# Patient Record
Sex: FEMALE | Race: WHITE | NOT HISPANIC OR LATINO | Employment: FULL TIME | URBAN - METROPOLITAN AREA
[De-identification: names, ages, dates, MRNs, and addresses within clinical notes are randomized per-mention and may not be internally consistent; named-entity substitution may affect disease eponyms.]

---

## 2017-03-09 ENCOUNTER — OFFICE VISIT (OUTPATIENT)
Dept: DERMATOLOGY | Facility: CLINIC | Age: 37
End: 2017-03-09
Payer: COMMERCIAL

## 2017-03-09 DIAGNOSIS — Z86.018 HISTORY OF DYSPLASTIC NEVUS: ICD-10-CM

## 2017-03-09 DIAGNOSIS — L90.5 SCAR: Primary | ICD-10-CM

## 2017-03-09 PROCEDURE — 99212 OFFICE O/P EST SF 10 MIN: CPT | Performed by: DERMATOLOGY

## 2017-03-09 NOTE — MR AVS SNAPSHOT
After Visit Summary   3/9/2017    Kaia Smith    MRN: [id number]           Patient Information     Date Of Birth          1980        Visit Information        Provider Department      3/9/2017 8:30 AM Ellen Okeefe MD [facility]        Today's Diagnoses     Scar    -  1    History of dysplastic nevus           Follow-ups after your visit        Your next 10 appointments already scheduled     Sep 13, 2017  9:45 AM CDT   [facility] with [facility]ue [facility]   [facility]    [address] [place] [address] [phone]              Who to contact     If you have questions or need follow up information about today's clinic visit or your schedule please contact [facility] directly at [phone].  Normal or non-critical lab and imaging results will be communicated to you by [employer], letter or phone within 4 business days after the clinic has received the results. If you do not hear from us within 7 days, please contact the clinic through [employer] or phone. If you have a critical or abnormal lab result, we will notify you by phone as soon as possible.  Submit refill requests through [employer] or call your pharmacy and they will forward the refill request to us. Please allow 3 business days for your refill to be completed.          Additional Information About Your Visit        MyChart Information     [employer] gives you secure access to your electronic health record. If you see a primary care provider, you can also send messages to your care team and make appointments. If you have questions, please call your primary care clinic.  If you do not have a primary care provider, please call [phone] and they will assist you.      [employer] is an electronic gateway that provides easy, online access to your medical records. With [employer], you can request a clinic appointment, read your test results, renew a  prescription or communicate with your care team.     To access your existing account, please contact your [facility] or call [phone] for assistance.        Care EveryWhere ID     This is your Care EveryWhere ID. This could be used by other organizations to access your [facility] medical records  [id number]         Blood Pressure from Last 3 Encounters:   10/18/16 95/60   07/12/16 106/70   06/20/16 99/60    Weight from Last 3 Encounters:   10/18/16 58.1 kg (128 lb 1.6 oz)   07/12/16 57.3 kg (126 lb 6.4 oz)   06/20/16 57.5 kg (126 lb 11.2 oz)              Today, you had the following     No orders found for display       Primary Care Provider Office Phone # Fax #    Annie Albrecht [phone]       [facility] [address] [facility] [address]        Thank you!     Thank you for choosing [facility]  for your care. Our goal is always to provide you with excellent care. Hearing back from our patients is one way we can continue to improve our services. Please take a few minutes to complete the written survey that you may receive in the mail after your visit with us. Thank you!             Your Updated Medication List - Protect others around you: Learn how to safely use, store and throw away your medicines at www.disposemymeds.org.      Notice  As of 3/9/2017  9:05 AM    You have not been prescribed any medications.

## 2017-03-09 NOTE — NURSING NOTE
Dermatology Rooming Note    Kaia Smith's goals for this visit include:   Chief Complaint   Patient presents with     RECHECK     6 month follow up - recheck mole on lower back       Is a scribe okay for this visit: YES    Are records needed for this visit(If yes, obtain release of information): NO     Vitals: There were no vitals taken for this visit.    Referring Provider:  No referring provider defined for this encounter.    Liliana Banks, CMA

## 2017-03-09 NOTE — PROGRESS NOTES
[facility] Dermatology Note      Dermatology Problem List:  1. Compound nevus with mild dysplasia, right lower back  -s/p biopsy 2016    Encounter Date: Mar 9, 2017    CC:  Chief Complaint   Patient presents with     RECHECK     6 month follow up - recheck mole on lower back         History of Present Illness:  Ms. Kaia Smith is a 36 year old female who presents as a follow up for dysplastic nevus. The patient was last seen 2016 when a biopsy was performed on the right lower back. Today, the patient denies any other lesions bleeding, crusting or changing. The patient reports no other lesions of concern.     Past Medical History:   Patient Active Problem List   Diagnosis     CARDIOVASCULAR SCREENING; LDL GOAL LESS THAN 160     General counseling for prescription of oral contraceptives     Abdominal pain, right upper quadrant     Past Medical History   Diagnosis Date     Acute mastitis of left breast 2014     Menarche      13 y/o     Traumatic injury during pregnancy 2013     Past Surgical History   Procedure Laterality Date     Hysteroscopy,diagnostic  2008     Remv pilonidal lesion simple  10/20/2005     Appendectomy       13 y/o      section  3/19/2013     Procedure:  SECTION;;  Surgeon: Maddie Stallworth MD;  Location: [facility] license of [facility]     Social History:  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 The patient has 1-2 alcoholic drinks per week does not smoke or use tobacco.     Family History:  There is no family history of skin cancer.    Medications:  No current outpatient prescriptions on file.     No Known Allergies    Review of Systems:  -Skin: As above in HPI. No additional skin concerns.    Physical exam:  Vitals: There were no vitals taken for this visit.  GEN: This is a well developed, well-nourished female in no acute distress, in a pleasant mood.    SKIN: Focused examination of the lower back was  performed.  -Scar with normal re-pigmentation. Compared with photos  -No other lesions of concern on areas examined.     Impression/Plan:  1. Compound nevus with mild dysplasia, right lower back, s/p biopsy 9/27/2016, some recurrence within scar but normal    Discussed options of monitoring versus excision and she elects for monitoring. Reviewed case with Dr. Garrido who agreed, recheck in 6 months    Plan to recheck at follow up visit.     Last total skin exam 9/27/2016    Follow up in 6 months, earlier for new or changing lesions.     Staff Involved:  Scribe/Staff    Scribe Disclosure:   I, Meme Banuelos, am serving as a scribe to document services personally performed by Dr. Ellen Okeefe, based on data collection and the provider's statements to me.     Provider Disclosure:   I agree with above History, Review of Systems, Physical exam and Plan. I have reviewed the content of the documentation and have edited it as needed. I have personally performed the services documented here and the documentation accurately represents those services and the decisions I have made.     Ellen Okeefe MD    Department of Dermatology  [facility]: Phone: [phone], Fax:[phone]  [facility]: Phone: [phone], Fax: [phone]

## 2017-08-03 ENCOUNTER — TELEPHONE (OUTPATIENT)
Dept: DERMATOLOGY | Facility: CLINIC | Age: 37
End: 2017-08-03

## 2017-08-03 NOTE — TELEPHONE ENCOUNTER
I left a message for patient to call [facility].  Patient has an appointment with Dr. Josue on 9/13 for skin check.  Provider is not available and appointment needs to be rescheduled.       SHe may also schedule with Suzanne or Dr. Okeefe when she comes back. OK to schedule.     Call Center - ok for you to notify patient of the following:    MD Helene Longoria PA Kristen McCarthy, PA  [facility]  [address] [place] [address]  [phone]    Dylan Buck MD  [facility]  [address] [place] [address]  [phone]    [facility]    Dermatology Department  [address] [place] [address]  [phone]    Maribell Boucher CMA

## 2017-08-14 ENCOUNTER — VIRTUAL VISIT (OUTPATIENT)
Dept: FAMILY MEDICINE | Facility: OTHER | Age: 37
End: 2017-08-14

## 2017-08-14 NOTE — PROGRESS NOTES
"Date:   Clinician: Mathieu Gupta  Clinician NPI: [id number]  Patient: Kaia Smith  Patient : 1980  Patient Address: [address] [place] [address]  Patient Phone: [phone]  Visit Protocol: UTI  Patient Summary:  Kaia is a 36 year old ( : 1980 ) female who initiated a Visit for a presumed bladder infection. When asked the question \"Please sign me up to receive news, health information and promotions from [employer].\", Kaia responded \"No\".    Her symptoms began today and consist of urgency, urinary frequency, and dysuria.   Symptom Details   Urinary Frequency: Several times each hour    She denies flank pain, loss of appetite, chills, fever, urinary incontinence, hesitation, vaginal discharge, abdominal pain, recent antibiotic use, hematuria, foul smelling urine, vomiting, and nausea. Kaia has never had kidney stones. She has not been hospitalized, been a patient in a nursing home, or had a catheter in the past two weeks. She denies risk factors for sexually transmitted infections.   Kaia has not had any UTIs in the past 12 months. Her current symptoms are similar to the previous UTI symptoms. She took an antibiotic for her last infection but does not remember which one.   Kaia does not get yeast infections when she takes antibiotics.   She denies pregnancy and denies breastfeeding. She is currently menstruating.   She does NOT smoke or use smokeless tobacco.  MEDICATIONS:  No current medications , ALLERGIES:  NKDA   Clinician Response:  Dear Kaia,  Based on the information you have provided, you likely have a bladder infection, also called an acute urinary tract infection (UTI).   To treat your infection, I am prescribing:   Nitrofurantoin (Macrobid). Swallow one (1) tablet twice a day for 5 days. Take the tablet with food. Continue taking the tablets even if you feel better before all the medication is gone. There is no refill with this prescription.   Some people develop " allergies to antibiotics. If you notice a new rash, significant swelling, or difficulty breathing, stop the medication immediately and go into a clinic for physical evaluation.   To help treat your current UTI and prevent future occurrences, remember to:     Drink 8-10, 8-ounce glasses of water daily.    Urinate after sexual intercourse.    Wipe front to back after using the bathroom.     Some women may develop a yeast infection as a side effect of taking antibiotics. If you notice symptoms of a yeast infection, [facility] can help treat that condition as well. Simply log in and complete another Visit, which will cover all of the necessary questions to determine the best treatment for you.   You should visit a clinic for a follow-up visit if your symptoms do not improve in 1-2 days or if you experience another urinary tract infection soon after completing this treatment.  If you become pregnant during this course of treatment, stop taking the medication and contact your primary care provider.   Diagnosis: Acute Uncomplicated Bladder Infection  Diagnosis ICD: N39.0  Prescription: nitrofurantoin (Macrobid) 100mg oral tablet 10 tablets, 5 days supply. Take one tablet by mouth two times a day for 5 days. Refills: 0, Refill as needed: no, Allow substitutions: yes  Pharmacy: [id number] - [phone] - [address] [address]

## 2017-08-15 NOTE — TELEPHONE ENCOUNTER
Left message for patient to call [phone] to reschedule.  She can be schedule earlier with Dr. Josue if there is an opening or with Lele Retana.    Liliana Banks CMA

## 2017-08-21 ENCOUNTER — OFFICE VISIT (OUTPATIENT)
Dept: DERMATOLOGY | Facility: CLINIC | Age: 37
End: 2017-08-21
Payer: COMMERCIAL

## 2017-08-21 DIAGNOSIS — Z86.018 HISTORY OF DYSPLASTIC NEVUS: Primary | ICD-10-CM

## 2017-08-21 DIAGNOSIS — D22.5 RECURRENT NEVUS OF BACK: ICD-10-CM

## 2017-08-21 PROCEDURE — 99212 OFFICE O/P EST SF 10 MIN: CPT | Performed by: DERMATOLOGY

## 2017-08-21 ASSESSMENT — PAIN SCALES - GENERAL: PAINLEVEL: NO PAIN (0)

## 2017-08-21 NOTE — MR AVS SNAPSHOT
After Visit Summary   8/21/2017    Kaia Smith    MRN: [id number]           Patient Information     Date Of Birth          1980        Visit Information        Provider Department      8/21/2017 1:15 PM Tomas Josue MD [facility]        Today's Diagnoses     History of dysplastic nevus    -  1    Recurrent nevus of back           Follow-ups after your visit        Your next 10 appointments already scheduled     Mar 09, 2018  9:45 AM CST   Return Visit with Ellen Okeefe MD   [facility] ([facility]    [id number] [place] [address] [phone]              Who to contact     If you have questions or need follow up information about today's clinic visit or your schedule please contact [facility] directly at [phone].  Normal or non-critical lab and imaging results will be communicated to you by [employer], letter or phone within 4 business days after the clinic has received the results. If you do not hear from us within 7 days, please contact the clinic through [employer] or phone. If you have a critical or abnormal lab result, we will notify you by phone as soon as possible.  Submit refill requests through [employer] or call your pharmacy and they will forward the refill request to us. Please allow 3 business days for your refill to be completed.          Additional Information About Your Visit        MyChart Information     [employer] gives you secure access to your electronic health record. If you see a primary care provider, you can also send messages to your care team and make appointments. If you have questions, please call your primary care clinic.  If you do not have a primary care provider, please call [phone] and they will assist you.      [employer] is an electronic gateway that provides easy, online access to your medical records. With [employer], you can request a clinic appointment, read your  test results, renew a prescription or communicate with your care team.     To access your existing account, please contact your [facility] or call [phone] for assistance.        Care EveryWhere ID     This is your Care EveryWhere ID. This could be used by other organizations to access your [facility] medical records  [id number]         Blood Pressure from Last 3 Encounters:   10/18/16 95/60   07/12/16 106/70   06/20/16 99/60    Weight from Last 3 Encounters:   10/18/16 58.1 kg (128 lb 1.6 oz)   07/12/16 57.3 kg (126 lb 6.4 oz)   06/20/16 57.5 kg (126 lb 11.2 oz)              Today, you had the following     No orders found for display       Primary Care Provider Office Phone # Fax #    Annie Albrecht [phone]       [facility] [address] [facility] [address]  [facility] [address]        Equal Access to Services     BUZZ RICHTER : Hadii aad ku hadasho Soomaali, waaxda luqadaha, qaybta kaalmada adeegyada, waxay idiin hayaan ivaneg kharamaidson tucker . So [facility] [phone].    ATENCIÓN: Si habla español, tiene a ramírez disposición servicios gratuitos de asistencia lingüística. Llame al [phone].    We comply with applicable federal civil rights laws and [place] laws. We do not discriminate on the basis of race, color, national origin, age, disability sex, sexual orientation or gender identity.            Thank you!     Thank you for choosing [facility]  for your care. Our goal is always to provide you with excellent care. Hearing back from our patients is one way we can continue to improve our services. Please take a few minutes to complete the written survey that you may receive in the mail after your visit with us. Thank you!             Your Updated Medication List - Protect others around you: Learn how to safely use, store and throw away your medicines at www.disposemymeds.org.      Notice  As of 8/21/2017  1:45 PM    You  have not been prescribed any medications.

## 2017-08-21 NOTE — PROGRESS NOTES
[facility] Dermatology Note      Dermatology Problem List:  1. Compound nevus with MILD dysplasia, right lower back with recurrence peripherally.  -s/p PUNCH biopsy 2016    Last TBSE: 2016    Encounter Date: Aug 21, 2017    CC:  Chief Complaint   Patient presents with     Derm Problem     Recheck dysplastic nevus on back.  No new areas of concern.       History of Present Illness:  Ms. Kaia Smith is a 36 year old female who presents as a follow up for dysplastic nevus. Pt was last seen 3/9/2017 by Dr. Okeefe when the spot was rechecked without cause for concern. Today she reports no changes at the site and no new areas of concern.     Past Medical History:   Patient Active Problem List   Diagnosis     CARDIOVASCULAR SCREENING; LDL GOAL LESS THAN 160     General counseling for prescription of oral contraceptives     Abdominal pain, right upper quadrant     Past Medical History:   Diagnosis Date     Acute mastitis of left breast 2014     Menarche     13 y/o     Traumatic injury during pregnancy 2013     Past Surgical History:   Procedure Laterality Date     APPENDECTOMY      13 y/o      SECTION  3/19/2013    Procedure:  SECTION;;  Surgeon: Maddie Stallworth MD;  [facility]     HYSTEROSCOPY,DIAGNOSTIC  2008     REMV PILONIDAL LESION SIMPLE  10/20/2005     Social History:  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 The patient has 1-2 alcoholic drinks per week does not smoke or use tobacco.     Family History:  There is no family history of skin cancer.    Medications:  No current outpatient prescriptions on file.     No Known Allergies    Review of Systems:  -Skin Establ Pt: The patient denies any new rash, pruritus, or lesions that are symptomatic, changing or bleeding, except as per HPI.  -Const: No fevers, chills, night sweats, or unintended weight changes.    Physical exam:  Vitals: There were no vitals  taken for this visit.  GEN: This is a well developed, well-nourished female in no acute distress, in a pleasant mood.    SKIN: Focused examination of the lower back was performed.  -on the right lower back site of prior mildly dysplastic nevus is a well-healed scar in the center with pigment in the periphery, 1 cm in widest diameter  -No other lesions of concern on areas examined.     Impression/Plan:  1. Recurrent nevus at the site of a previously biopsied mild dysplastic nevus of the right lower back, s/p punch biopsy 9/27/2016. I recommended a broad shave although watching is also ok as Mild dysplastic nevus doesn't increase melanoma risk. Sun exposure during the summer likely activated the melanocytes.     Offered pt shave biopsy to remove the surrounding pigment, pt elected to monitor the lesion    Photo taken    Follow up in 6 months for skin check, earlier for new or changing lesions.     Staff Involved:  Scribe/Staff    Scribe Disclosure:   I, Silvano Cantu, am serving as a scribe to document services personally performed by Dr. Tomas Josue, based on data collection and the provider's statements to me.    Provider Disclosure:   I have reviewed the documentation recorded by the scribe and have edited it as needed. I have personally performed the services documented here and the documentation accurately represents those services and the decisions made by me.     Tomas Josue MD, MS    Department of Dermatology  [facility]: Phone: [phone], Fax:[phone]  [facility]: Phone: [phone], Fax: [phone]

## 2017-08-21 NOTE — LETTER
2017       RE: Kaia Smith  [address] [facility] [address]     Dear Colleague,    Thank you for referring your patient, Kaia Smith, to the [facility]. Please see a copy of my visit note below.    [facility] Dermatology Note      Dermatology Problem List:  1. Compound nevus with MILD dysplasia, right lower back with recurrence peripherally.  -s/p PUNCH biopsy 2016    Last TBSE: 2016    Encounter Date: Aug 21, 2017    CC:  Chief Complaint   Patient presents with     Derm Problem     Recheck dysplastic nevus on back.  No new areas of concern.       History of Present Illness:  Ms. Kaia Smith is a 36 year old female who presents as a follow up for dysplastic nevus. Pt was last seen 3/9/2017 by Dr. Okeefe when the spot was rechecked without cause for concern. Today she reports no changes at the site and no new areas of concern.     Past Medical History:   Patient Active Problem List   Diagnosis     CARDIOVASCULAR SCREENING; LDL GOAL LESS THAN 160     General counseling for prescription of oral contraceptives     Abdominal pain, right upper quadrant     Past Medical History:   Diagnosis Date     Acute mastitis of left breast 2014     Menarche     13 y/o     Traumatic injury during pregnancy 2013     Past Surgical History:   Procedure Laterality Date     APPENDECTOMY      15 y/o      SECTION  3/19/2013    Procedure:  SECTION;;  Surgeon: Maddie Stallworth MD;  [facility]     HYSTEROSCOPY,DIAGNOSTIC  2008     REMV PILONIDAL LESION SIMPLE  10/20/2005     Social History:  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 The patient has 1-2 alcoholic drinks per week does not smoke or use tobacco.     Family History:  There is no family history of skin cancer.    Medications:  No current outpatient prescriptions on file.      No Known Allergies    Review of Systems:  -Skin Establ Pt: The patient denies any new rash, pruritus, or lesions that are symptomatic, changing or bleeding, except as per HPI.  -Const: No fevers, chills, night sweats, or unintended weight changes.    Physical exam:  Vitals: There were no vitals taken for this visit.  GEN: This is a well developed, well-nourished female in no acute distress, in a pleasant mood.    SKIN: Focused examination of the lower back was performed.  -on the right lower back site of prior mildly dysplastic nevus is a well-healed scar in the center with pigment in the periphery, 1 cm in widest diameter  -No other lesions of concern on areas examined.     Impression/Plan:  1. Recurrent nevus at the site of a previously biopsied mild dysplastic nevus of the right lower back, s/p punch biopsy 9/27/2016. I recommended a broad shave although watching is also ok as Mild dysplastic nevus doesn't increase melanoma risk. Sun exposure during the summer likely activated the melanocytes.     Offered pt shave biopsy to remove the surrounding pigment, pt elected to monitor the lesion    Photo taken    Follow up in 6 months for skin check, earlier for new or changing lesions.     Staff Involved:  Scribe/Staff    Scribe Disclosure:   I, Silvano Cantu, am serving as a scribe to document services personally performed by Dr. Tomas Josue, based on data collection and the provider's statements to me.    Provider Disclosure:   I have reviewed the documentation recorded by the scribe and have edited it as needed. I have personally performed the services documented here and the documentation accurately represents those services and the decisions made by me.     Tomas Josue MD, MS    Department of Dermatology  [facility]: Phone: [phone], Fax:[phone]  [facility]: Phone: [phone],  Fax: [phone]          Again, thank you for allowing me to participate in the care of your patient.      Sincerely,    Tomas Josue MD

## 2017-08-21 NOTE — NURSING NOTE
Dermatology Rooming Note    Kaia Smith's goals for this visit include:   Chief Complaint   Patient presents with     Derm Problem     Recheck dysplastic nevus on back.  No new areas of concern.       Is a scribe okay for this visit:YES    Are records needed for this visit(If yes, obtain release of information): No     Vitals: There were no vitals taken for this visit.    Referring Provider:  No referring provider defined for this encounter.      Gladys Soliman RN

## 2018-03-07 ENCOUNTER — OFFICE VISIT (OUTPATIENT)
Dept: FAMILY MEDICINE | Facility: CLINIC | Age: 38
End: 2018-03-07
Payer: COMMERCIAL

## 2018-03-07 VITALS
OXYGEN SATURATION: 100 % | BODY MASS INDEX: 20.41 KG/M2 | DIASTOLIC BLOOD PRESSURE: 82 MMHG | RESPIRATION RATE: 18 BRPM | TEMPERATURE: 98.3 F | HEIGHT: 66 IN | WEIGHT: 127 LBS | HEART RATE: 76 BPM | SYSTOLIC BLOOD PRESSURE: 100 MMHG

## 2018-03-07 DIAGNOSIS — J02.9 ACUTE PHARYNGITIS, UNSPECIFIED ETIOLOGY: Primary | ICD-10-CM

## 2018-03-07 LAB
DEPRECATED S PYO AG THROAT QL EIA: NORMAL
SPECIMEN SOURCE: NORMAL

## 2018-03-07 PROCEDURE — 87880 STREP A ASSAY W/OPTIC: CPT | Performed by: FAMILY MEDICINE

## 2018-03-07 PROCEDURE — 87081 CULTURE SCREEN ONLY: CPT | Performed by: FAMILY MEDICINE

## 2018-03-07 PROCEDURE — 99213 OFFICE O/P EST LOW 20 MIN: CPT | Performed by: FAMILY MEDICINE

## 2018-03-07 RX ORDER — AMOXICILLIN 500 MG/1
500 CAPSULE ORAL 3 TIMES DAILY
Qty: 30 CAPSULE | Refills: 0 | Status: SHIPPED | OUTPATIENT
Start: 2018-03-07 | End: 2018-09-18

## 2018-03-07 NOTE — NURSING NOTE
"Chief Complaint   Patient presents with     Pharyngitis       Initial /82 (BP Location: Right arm, Patient Position: Sitting, Cuff Size: Adult Regular)  Pulse 76  Temp 98.3  F (36.8  C) (Oral)  Resp 18  Ht 1.67 m (5' 5.75\")  Wt 57.6 kg (127 lb)  SpO2 100%  BMI 20.65 kg/m2 Estimated body mass index is 20.65 kg/(m^2) as calculated from the following:    Height as of this encounter: 1.67 m (5' 5.75\").    Weight as of this encounter: 57.6 kg (127 lb).  Medication Reconciliation: eloise Alvarze        "

## 2018-03-07 NOTE — PROGRESS NOTES
"  SUBJECTIVE:   Kaia Smith is a 37 year old female who presents to clinic today for the following health issues:      Acute Illness   Acute illness concerns: sore throat  Onset: 4 days ago    Fever: no    Chills/Sweats: no    Headache (location?): no    Sinus Pressure:no    Conjunctivitis:  no    Ear Pain: YES: left    Rhinorrhea: no    Congestion: no    Sore Throat: YES     Cough: no    Wheeze: no    Decreased Appetite: no    Nausea: no    Vomiting: no    Diarrhea:  no    Dysuria/Freq.: no    Fatigue/Achiness: YES    Sick/Strep Exposure: no     Therapies Tried and outcome: nothing    SUBJECTIVE:  Here with the above.  No URI symptoms - just the ST - assumed it would get better, but slightly worse.    Review of systems otherwise negative.  Past medical, family, and social history reviewed and updated in chart.    OBJECTIVE:  /82 (BP Location: Right arm, Patient Position: Sitting, Cuff Size: Adult Regular)  Pulse 76  Temp 98.3  F (36.8  C) (Oral)  Resp 18  Ht 1.67 m (5' 5.75\")  Wt 57.6 kg (127 lb)  SpO2 100%  BMI 20.65 kg/m2  Alert, pleasant, upbeat, and in no apparent discomfort.  Ears clear bilateral   Nose clear  OP - mod red posterior pharynx - glands somewhat enlarged  S1 and S2 normal, no murmurs, clicks, gallops or rubs. Regular rate and rhythm. Chest is clear; no wheezes or rales. No edema or JVD.  QS negative     ASSESSMENT / PLAN:  (J02.9) Acute pharyngitis, unspecified etiology  (primary encounter diagnosis)  Comment: still consistent with acute pharyngitis   Plan: Strep, Rapid Screen, Beta strep group A         culture, amoxicillin (AMOXIL) 500 MG capsule        Discussed mechanism of action of the proposed medication, as well as potential effects, both good and bad.  Patient expressed understanding and agreed with treatment.     Follow up based upon cx results   STACEY Renteria MD    (Chart documentation completed in part with Dragon voice-recognition software.  Even though " reviewed some grammatical, spelling, and word errors may remain.)

## 2018-03-07 NOTE — MR AVS SNAPSHOT
After Visit Summary   3/7/2018    Kaia Smith    MRN: [id number]           Patient Information     Date Of Birth          1980        Visit Information        Provider Department      3/7/2018 1:20 PM Lashell Renteria MD [facility]        Today's Diagnoses     Acute pharyngitis, unspecified etiology    -  1       Follow-ups after your visit        Follow-up notes from your care team     Return if symptoms worsen or fail to improve.      Your next 10 appointments already scheduled     Jan 11, 2019 12:15 PM CST   Return Visit with Ellen Okeefe MD   [facility] ([facility])    [address] [address] [place] [id number] [phone]              Who to contact     If you have questions or need follow up information about today's clinic visit or your schedule please contact [facility] directly at [phone].  Normal or non-critical lab and imaging results will be communicated to you by MyChart, letter or phone within 4 business days after the clinic has received the results. If you do not hear from us within 7 days, please contact the clinic through MyChart or phone. If you have a critical or abnormal lab result, we will notify you by phone as soon as possible.  Submit refill requests through [employer] or call your pharmacy and they will forward the refill request to us. Please allow 3 business days for your refill to be completed.          Additional Information About Your Visit        MyChart Information     [employer] gives you secure access to your electronic health record. If you see a primary care provider, you can also send messages to your care team and make appointments. If you have questions, please call your primary care clinic.  If you do not have a primary care provider, please call [phone] and they will assist you.        Care EveryWhere ID     This is your Care EveryWhere ID. This could be used by  "other organizations to access your [place] medical records  [id number]        Your Vitals Were     Pulse Temperature Respirations Height Pulse Oximetry BMI (Body Mass Index)    76 98.3  F (36.8  C) (Oral) 18 1.67 m (5' 5.75\") 100% 20.65 kg/m2       Blood Pressure from Last 3 Encounters:   03/07/18 100/82   10/18/16 95/60   07/12/16 106/70    Weight from Last 3 Encounters:   03/07/18 57.6 kg (127 lb)   10/18/16 58.1 kg (128 lb 1.6 oz)   07/12/16 57.3 kg (126 lb 6.4 oz)              We Performed the Following     Beta strep group A culture     Strep, Rapid Screen          Today's Medication Changes          These changes are accurate as of 3/7/18 11:59 PM.  If you have any questions, ask your nurse or doctor.               Start taking these medicines.        Dose/Directions    amoxicillin 500 MG capsule   Commonly known as:  AMOXIL   Used for:  Acute pharyngitis, unspecified etiology   Started by:  Lashell Renteria MD        Dose:  500 mg   Take 1 capsule (500 mg) by mouth 3 times daily   Quantity:  30 capsule   Refills:  0            Where to get your medicines      These medications were sent to [facility]/pharmacy #[id number] - [facility] [address] [facility] [place] [address]     Phone:  [phone]     amoxicillin 500 MG capsule                Primary Care Provider Office Phone # Fax #    [facility] [phone]       [address] [place] [address]        Equal Access to Services     [facility] : Hadii aad ku hadashparth Sogabriela, waaxda luqadaha, qaybta kaalmada umu beck. So [facility] [phone].    ATENCIÓN: Si habla español, tiene a ramírez disposición servicios gratuitos de asistencia lingüística. Llame al [phone].    We comply with applicable federal civil rights laws and [place] laws. We do not discriminate on the basis of race, color, national origin, age, " disability, sex, sexual orientation, or gender identity.            Thank you!     Thank you for choosing [facility]  for your care. Our goal is always to provide you with excellent care. Hearing back from our patients is one way we can continue to improve our services. Please take a few minutes to complete the written survey that you may receive in the mail after your visit with us. Thank you!             Your Updated Medication List - Protect others around you: Learn how to safely use, store and throw away your medicines at www.disposemymeds.org.          This list is accurate as of 3/7/18 11:59 PM.  Always use your most recent med list.                   Brand Name Dispense Instructions for use Diagnosis    amoxicillin 500 MG capsule    AMOXIL    30 capsule    Take 1 capsule (500 mg) by mouth 3 times daily    Acute pharyngitis, unspecified etiology

## 2018-03-08 LAB
BACTERIA SPEC CULT: NORMAL
SPECIMEN SOURCE: NORMAL

## 2018-03-09 ENCOUNTER — OFFICE VISIT (OUTPATIENT)
Dept: DERMATOLOGY | Facility: CLINIC | Age: 38
End: 2018-03-09
Payer: COMMERCIAL

## 2018-03-09 DIAGNOSIS — L81.4 SOLAR LENTIGINOSIS: ICD-10-CM

## 2018-03-09 DIAGNOSIS — Z86.018 HISTORY OF DYSPLASTIC NEVUS: ICD-10-CM

## 2018-03-09 DIAGNOSIS — D22.9 MULTIPLE BENIGN NEVI: Primary | ICD-10-CM

## 2018-03-09 PROCEDURE — 99213 OFFICE O/P EST LOW 20 MIN: CPT | Performed by: DERMATOLOGY

## 2018-03-09 NOTE — MR AVS SNAPSHOT
After Visit Summary   3/9/2018    Kaia Smith    MRN: [id number]           Patient Information     Date Of Birth          1980        Visit Information        Provider Department      3/9/2018 9:45 AM Ellen Okeefe MD [facility]        Today's Diagnoses     Multiple benign nevi    -  1    History of dysplastic nevus        Solar lentiginosis           Follow-ups after your visit        Follow-up notes from your care team     Return in about 9 months (around 12/9/2018).      Who to contact     If you have questions or need follow up information about today's clinic visit or your schedule please contact [facility] directly at [phone].  Normal or non-critical lab and imaging results will be communicated to you by [employer], letter or phone within 4 business days after the clinic has received the results. If you do not hear from us within 7 days, please contact the clinic through [employer] or phone. If you have a critical or abnormal lab result, we will notify you by phone as soon as possible.  Submit refill requests through [employer] or call your pharmacy and they will forward the refill request to us. Please allow 3 business days for your refill to be completed.          Additional Information About Your Visit        MyChart Information     [employer] gives you secure access to your electronic health record. If you see a primary care provider, you can also send messages to your care team and make appointments. If you have questions, please call your primary care clinic.  If you do not have a primary care provider, please call [phone] and they will assist you.      [employer] is an electronic gateway that provides easy, online access to your medical records. With [employer], you can request a clinic appointment, read your test results, renew a prescription or communicate with your care team.     To access your existing account, please contact your [facility]  [place] [facility] or call [phone] for assistance.        Care EveryWhere ID     This is your Care EveryWhere ID. This could be used by other organizations to access your [facility] medical records  [id number]         Blood Pressure from Last 3 Encounters:   03/07/18 100/82   10/18/16 95/60   07/12/16 106/70    Weight from Last 3 Encounters:   03/07/18 57.6 kg (127 lb)   10/18/16 58.1 kg (128 lb 1.6 oz)   07/12/16 57.3 kg (126 lb 6.4 oz)              Today, you had the following     No orders found for display       Primary Care Provider Office Phone # Fax #    [facility] [phone] [address] [address]        Equal Access to Services     ISAAC RICHTER : Hadii nicolas clark hadasho Soomaali, waaxda luqadaha, qaybta kaalmada adeegyada, umu tucker . So [facility] [phone].    ATENCIÓN: Si habla español, tiene a ramírez disposición servicios gratuitos de asistencia lingüística. [facility] [phone].    We comply with applicable federal civil rights laws and [place] laws. We do not discriminate on the basis of race, color, national origin, age, disability, sex, sexual orientation, or gender identity.            Thank you!     Thank you for choosing [facility]  for your care. Our goal is always to provide you with excellent care. Hearing back from our patients is one way we can continue to improve our services. Please take a few minutes to complete the written survey that you may receive in the mail after your visit with us. Thank you!             Your Updated Medication List - Protect others around you: Learn how to safely use, store and throw away your medicines at www.disposemymeds.org.          This list is accurate as of 3/9/18 10:13 AM.  Always use your most recent med list.                   Brand Name Dispense Instructions for use Diagnosis    amoxicillin 500 MG capsule    AMOXIL    30 capsule    Take 1  capsule (500 mg) by mouth 3 times daily    Acute pharyngitis, unspecified etiology

## 2018-03-09 NOTE — LETTER
3/9/2018         RE: Kaia Smith  [address] [address]        Dear Colleague,    Thank you for referring your patient, Kaia Smith, to the [facility]. Please see a copy of my visit note below.    [facility] Dermatology Note      Dermatology Problem List:  1. Compound nevus with mild dysplasia, right lower back with recurrence peripherally.  -s/p biopsy 2016    Last TBSE: 2016    Encounter Date: Mar 9, 2018    CC:  Chief Complaint   Patient presents with     Skin Check     no lesions of concern       History of Present Illness:  Ms. Kaia Smith is a 37 year old female who presents as a follow up for dysplastic nevus. Pt was last seen 2017 when the patient was seen for recurrent mildly dysplastic nevus. The patient reports no spots that are bleeding crusting or changing. She has no specific concerns.     Past Medical History:   Patient Active Problem List   Diagnosis     CARDIOVASCULAR SCREENING; LDL GOAL LESS THAN 160     General counseling for prescription of oral contraceptives     Abdominal pain, right upper quadrant     Past Medical History:   Diagnosis Date     Acute mastitis of left breast 2014     Menarche     13 y/o     Traumatic injury during pregnancy 2013     Past Surgical History:   Procedure Laterality Date     APPENDECTOMY      15 y/o      SECTION  3/19/2013    Procedure:  SECTION;;  Surgeon: Maddie Stallworth MD;  [facility]: [facility]     HYSTEROSCOPY,DIAGNOSTIC  2008     REMV PILONIDAL LESION SIMPLE  10/20/2005     Social History:  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    Kept in chart for convenience.       Family History:  There is no family history of skin cancer.  Kept in chart for convenience.       Medications:  Current Outpatient Prescriptions   Medication Sig Dispense Refill     amoxicillin (AMOXIL) 500 MG capsule Take 1 capsule  (500 mg) by mouth 3 times daily 30 capsule 0     No Known Allergies    Review of Systems:  -Skin: As per HPI.   -Const: Feeling generally well. Not pregnant or breastfeeding.     Physical exam:  Vitals: There were no vitals taken for this visit.  GEN: This is a well developed, well-nourished female in no acute distress, in a pleasant mood.    SKIN: Total skin excluding the undergarment areas was performed. The exam included the head/face, neck, both arms, chest, back, abdomen, both legs, digits and/or nails. Declines genital and buttock exam.   -On the right lower back at the site of prior mildly dysplastic nevus, there is a well-healed scar, with pigment in the periphery, unchanged from photo  - Multiple regular brown pigmented macules and papules are identified on the trunk and extremities.   - Scattered brown macules on sun exposed areas.  -No other lesions of concern on areas examined.     Impression/Plan:  1. Recurrent nevus at the site of a previously biopsied mild dysplastic nevus of the right lower back, s/p punch biopsy 9/27/2016. HAve offered removal and declined. Lesion is stable. No evidence of atypical repigmetnation    Last TBSE 3/2018     Photo taken again today.     Patient elects for clinical monitoring. Will extend follow up for 6 months to 9 months  2. Solar lentigines and multiple benign nevi     No further intervention required at this time.     Follow up in 9 months for recheck of nevus, earlier for new or changing lesions.     Staff Involved:  Scribe/Staff    Scribe Disclosure:   I, Viridiana Fields, am serving as a scribe to document services personally performed by Dr. Ellen Okeefe, based on data collection and the provider's statements to me.       Provider Disclosure:   The documentation recorded by the scribe accurately reflects the services I personally performed and the decisions made by me.    Ellen Okeefe MD    Department of Dermatology  [facility]  [facility]: Phone: [phone], Fax:[phone]  [facility]: Phone: [phone], Fax: [phone]        Again, thank you for allowing me to participate in the care of your patient.        Sincerely,        Ellen Okeefe MD

## 2018-03-09 NOTE — PROGRESS NOTES
Kaia,  Your throat culture was negative as well.  So, based upon how you are feeling we could continue the antibiotics, or feel free to stop them if you're feeling better.    STACEY Renteria M.D.

## 2018-03-09 NOTE — NURSING NOTE
Dermatology Rooming Note    Kaia Smith's goals for this visit include:   Chief Complaint   Patient presents with     Skin Check     no lesions of concern       Is a scribe okay for this visit: YES    Are records needed for this visit(If yes, obtain release of information): NO     Vitals: There were no vitals taken for this visit.    Referring Provider:  No referring provider defined for this encounter.    Liliana Banks, CMA

## 2018-03-09 NOTE — PROGRESS NOTES
[facility] Dermatology Note      Dermatology Problem List:  1. Compound nevus with mild dysplasia, right lower back with recurrence peripherally.  -s/p biopsy 2016    Last TBSE: 2016    Encounter Date: Mar 9, 2018    CC:  Chief Complaint   Patient presents with     Skin Check     no lesions of concern       History of Present Illness:  Ms. Kaia Smith is a 37 year old female who presents as a follow up for dysplastic nevus. Pt was last seen 2017 when the patient was seen for recurrent mildly dysplastic nevus. The patient reports no spots that are bleeding crusting or changing. She has no specific concerns.     Past Medical History:   Patient Active Problem List   Diagnosis     CARDIOVASCULAR SCREENING; LDL GOAL LESS THAN 160     General counseling for prescription of oral contraceptives     Abdominal pain, right upper quadrant     Past Medical History:   Diagnosis Date     Acute mastitis of left breast 2014     Menarche     13 y/o     Traumatic injury during pregnancy 2013     Past Surgical History:   Procedure Laterality Date     APPENDECTOMY      13 y/o      SECTION  3/19/2013    Procedure:  SECTION;;  Surgeon: Maddie Stallworth MD;  [facility]     HYSTEROSCOPY,DIAGNOSTIC  2008     REMV PILONIDAL LESION SIMPLE  10/20/2005     Social History:  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    Kept in chart for convenience.       Family History:  There is no family history of skin cancer.  Kept in chart for convenience.       Medications:  Current Outpatient Prescriptions   Medication Sig Dispense Refill     amoxicillin (AMOXIL) 500 MG capsule Take 1 capsule (500 mg) by mouth 3 times daily 30 capsule 0     No Known Allergies    Review of Systems:  -Skin: As per HPI.   -Const: Feeling generally well. Not pregnant or breastfeeding.     Physical exam:  Vitals: There were no vitals taken for this visit.  GEN:  This is a well developed, well-nourished female in no acute distress, in a pleasant mood.    SKIN: Total skin excluding the undergarment areas was performed. The exam included the head/face, neck, both arms, chest, back, abdomen, both legs, digits and/or nails. Declines genital and buttock exam.   -On the right lower back at the site of prior mildly dysplastic nevus, there is a well-healed scar, with pigment in the periphery, unchanged from photo  - Multiple regular brown pigmented macules and papules are identified on the trunk and extremities.   - Scattered brown macules on sun exposed areas.  -No other lesions of concern on areas examined.     Impression/Plan:  1. Recurrent nevus at the site of a previously biopsied mild dysplastic nevus of the right lower back, s/p punch biopsy 9/27/2016. HAve offered removal and declined. Lesion is stable. No evidence of atypical repigmetnation    Last TBSE 3/2018     Photo taken again today.     Patient elects for clinical monitoring. Will extend follow up for 6 months to 9 months  2. Solar lentigines and multiple benign nevi     No further intervention required at this time.     Follow up in 9 months for recheck of nevus, earlier for new or changing lesions.     Staff Involved:  Scribe/Staff    Scribe Disclosure:   I, Viridiana Fields, am serving as a scribe to document services personally performed by Dr. Ellen Okeefe, based on data collection and the provider's statements to me.       Provider Disclosure:   The documentation recorded by the scribe accurately reflects the services I personally performed and the decisions made by me.    Ellen Okeefe MD    Department of Dermatology  [facility]: Phone: [phone], Fax:[phone]  [facility]: Phone: [phone], Fax: [phone]

## 2018-09-18 ENCOUNTER — OFFICE VISIT (OUTPATIENT)
Dept: PEDIATRICS | Facility: CLINIC | Age: 38
End: 2018-09-18
Payer: COMMERCIAL

## 2018-09-18 VITALS
DIASTOLIC BLOOD PRESSURE: 58 MMHG | HEART RATE: 76 BPM | SYSTOLIC BLOOD PRESSURE: 100 MMHG | TEMPERATURE: 98 F | HEIGHT: 66 IN | OXYGEN SATURATION: 100 % | WEIGHT: 130.6 LBS | BODY MASS INDEX: 20.99 KG/M2

## 2018-09-18 DIAGNOSIS — Z20.818 EXPOSURE TO STREP THROAT: Primary | ICD-10-CM

## 2018-09-18 LAB
DEPRECATED S PYO AG THROAT QL EIA: NORMAL
SPECIMEN SOURCE: NORMAL

## 2018-09-18 PROCEDURE — 99213 OFFICE O/P EST LOW 20 MIN: CPT | Performed by: INTERNAL MEDICINE

## 2018-09-18 PROCEDURE — 87880 STREP A ASSAY W/OPTIC: CPT | Performed by: INTERNAL MEDICINE

## 2018-09-18 PROCEDURE — 87081 CULTURE SCREEN ONLY: CPT | Performed by: INTERNAL MEDICINE

## 2018-09-18 NOTE — PROGRESS NOTES
////  SUBJECTIVE:   Kaia Smith is a 37 year old female who presents to clinic today for the following health issues:      Acute Illness   Acute illness concerns: Sore throat  Onset: 18    Fever: no     Chills/Sweats: no     Headache (location?): no     Sinus Pressure:no    Conjunctivitis:  no    Ear Pain: no    Rhinorrhea: no     Congestion: no     Sore Throat: YES- itchy     Cough: no    Wheeze: no    Decreased Appetite: no    Nausea: no    Vomiting: no    Diarrhea:  no    Dysuria/Freq.: no    Fatigue/Achiness: no    Sick/Strep Exposure: YES- son diagnosed with strep yesterday     Therapies Tried and outcome: None      HPI  37-year-old non-smoker comes in complaining of scratchy throat for a day.  No fever or chills.  1 of her child has strep throat infection so she is concerned.    Problem list and histories reviewed & adjusted, as indicated.  Additional history: as documented    Patient Active Problem List   Diagnosis     CARDIOVASCULAR SCREENING; LDL GOAL LESS THAN 160     General counseling for prescription of oral contraceptives     Abdominal pain, right upper quadrant     Past Surgical History:   Procedure Laterality Date     APPENDECTOMY      13 y/o      SECTION  3/19/2013    Procedure:  SECTION;;  Surgeon: Maddie Stallworth MD;  [facility]     HYSTEROSCOPY,DIAGNOSTIC  2008     REMV PILONIDAL LESION SIMPLE  10/20/2005       Social History   Substance Use Topics     Smoking status: Never Smoker     Smokeless tobacco: Never Used     Alcohol use No     Family History   Problem Relation Age of Onset     Cancer Father      lymphoma     Hypertension Father      Alzheimer Disease Maternal Grandmother      Alzheimer Disease Paternal Grandfather      Hypertension Mother      Diabetes No family hx of      Coronary Artery Disease No family hx of      Hyperlipidemia No family hx of      Cerebrovascular Disease No family hx of      Breast Cancer No family hx of      Colon Cancer  "No family hx of      Prostate Cancer No family hx of      Anxiety Disorder No family hx of      Depression No family hx of      Thyroid Disease No family hx of      Genetic Disorder No family hx of          No current outpatient prescriptions on file.     No Known Allergies    Reviewed and updated as needed this visit by clinical staff       Reviewed and updated as needed this visit by Provider         ROS:  Constitutional, HEENT, cardiovascular, pulmonary, gi and gu systems are negative, except as otherwise noted.    OBJECTIVE:     /58 (BP Location: Right arm, Patient Position: Sitting, Cuff Size: Adult Regular)  Pulse 76  Temp 98  F (36.7  C) (Temporal)  Ht 5' 5.75\" (1.67 m)  Wt 130 lb 9.6 oz (59.2 kg)  LMP 09/10/2018  SpO2 100%  BMI 21.24 kg/m2  Body mass index is 21.24 kg/(m^2).  GENERAL: healthy, alert and no distress  HENT: ear canals and TM's normal, nose and mouth without ulcers or lesions.  Oropharynx did not appear erythematous.  NECK: no adenopathy, no asymmetry, masses, or scars and thyroid normal to palpation    Diagnostic Test Results:  Results for orders placed or performed in visit on 09/18/18 (from the past 24 hour(s))   Strep, Rapid Screen   Result Value Ref Range    Specimen Description Throat     Rapid Strep A Screen       NEGATIVE: No Group A streptococcal antigen detected by immunoassay, await culture report.       ASSESSMENT/PLAN:       1.  Viral URI.  Suspect that she is having very early symptoms.  Strep rapid test was negative.  Symptomatic treatment recommended.    Baron Aceves MD  [facility]  "

## 2018-09-18 NOTE — MR AVS SNAPSHOT
After Visit Summary   9/18/2018    Kaia Smith    MRN: [id number]           Patient Information     Date Of Birth          1980        Visit Information        Provider Department      9/18/2018 9:30 AM Baron Aceves MD [facility]        Today's Diagnoses     Exposure to strep throat    -  1       Follow-ups after your visit        Follow-up notes from your care team     Return if symptoms worsen or fail to improve.      Your next 10 appointments already scheduled     Jan 11, 2019 12:15 PM CST   Return Visit with Ellen Okeefe MD   [facility] ([facility]    [address] [place] [id number] [phone]              Who to contact     If you have questions or need follow up information about today's clinic visit or your schedule please contact [facility] directly at [phone].  Normal or non-critical lab and imaging results will be communicated to you by [facility], letter or phone within 4 business days after the clinic has received the results. If you do not hear from us within 7 days, please contact the clinic through [facility] or phone. If you have a critical or abnormal lab result, we will notify you by phone as soon as possible.  Submit refill requests through [facility] or call your pharmacy and they will forward the refill request to us. Please allow 3 business days for your refill to be completed.          Additional Information About Your Visit        MyChart Information     [facility] gives you secure access to your electronic health record. If you see a primary care provider, you can also send messages to your care team and make appointments. If you have questions, please call your primary care clinic.  If you do not have a primary care provider, please call [phone] and they will assist you.      [facility] is an electronic gateway that provides easy, online access to your medical records. With  "MyChart, you can request a clinic appointment, read your test results, renew a prescription or communicate with your care team.     To access your existing account, please contact your [facility] or call [phone] for assistance.        Care EveryWhere ID     This is your Care EveryWhere ID. This could be used by other organizations to access your [place] medical records  [id number]        Your Vitals Were     Pulse Temperature Height Last Period Pulse Oximetry BMI (Body Mass Index)    76 98  F (36.7  C) (Temporal) 5' 5.75\" (1.67 m) 09/10/2018 100% 21.24 kg/m2       Blood Pressure from Last 3 Encounters:   09/18/18 100/58   03/07/18 100/82   10/18/16 95/60    Weight from Last 3 Encounters:   09/18/18 130 lb 9.6 oz (59.2 kg)   03/07/18 127 lb (57.6 kg)   10/18/16 128 lb 1.6 oz (58.1 kg)              We Performed the Following     Beta strep group A culture     Strep, Rapid Screen        Primary Care Provider Office Phone # Fax #    [facility] [phone] [address] [facility] [address]        Equal Access to Services     ISAAC RICHTER AH: Hadii nicolas ku hadasho Soomaali, waaxda luqadaha, qaybta kaalmada adeegyada, waxay idiin hayyfnn jonathan ortiz. So [facility] [phone].    ATENCIÓN: Si habla español, tiene a ramírez disposición servicios gratuitos de asistencia lingüística. Llame al [phone].    We comply with applicable federal civil rights laws and [place] laws. We do not discriminate on the basis of race, color, national origin, age, disability, sex, sexual orientation, or gender identity.            Thank you!     Thank you for choosing [facility]  for your care. Our goal is always to provide you with excellent care. Hearing back from our patients is one way we can continue to improve our services. Please take a few minutes to complete the written survey that you may receive in the mail after your visit with " us. Thank you!             Your Updated Medication List - Protect others around you: Learn how to safely use, store and throw away your medicines at www.disposemymeds.org.      Notice  As of 9/18/2018 10:36 AM    You have not been prescribed any medications.

## 2018-09-19 LAB
BACTERIA SPEC CULT: NORMAL
SPECIMEN SOURCE: NORMAL

## 2019-01-07 ENCOUNTER — OFFICE VISIT (OUTPATIENT)
Dept: OBGYN | Facility: CLINIC | Age: 39
End: 2019-01-07
Payer: COMMERCIAL

## 2019-01-07 VITALS
OXYGEN SATURATION: 99 % | BODY MASS INDEX: 20.34 KG/M2 | WEIGHT: 126.6 LBS | HEART RATE: 81 BPM | SYSTOLIC BLOOD PRESSURE: 114 MMHG | DIASTOLIC BLOOD PRESSURE: 77 MMHG | HEIGHT: 66 IN

## 2019-01-07 DIAGNOSIS — E55.9 VITAMIN D DEFICIENCY: ICD-10-CM

## 2019-01-07 DIAGNOSIS — Z00.00 ANNUAL PHYSICAL EXAM: Primary | ICD-10-CM

## 2019-01-07 DIAGNOSIS — Z30.09 GENERAL COUNSELING FOR PRESCRIPTION OF ORAL CONTRACEPTIVES: ICD-10-CM

## 2019-01-07 DIAGNOSIS — Z13.1 SCREENING FOR DIABETES MELLITUS: ICD-10-CM

## 2019-01-07 DIAGNOSIS — Z13.220 LIPID SCREENING: ICD-10-CM

## 2019-01-07 DIAGNOSIS — Z13.29 SCREENING FOR THYROID DISORDER: ICD-10-CM

## 2019-01-07 PROCEDURE — 87624 HPV HI-RISK TYP POOLED RSLT: CPT | Performed by: OBSTETRICS & GYNECOLOGY

## 2019-01-07 PROCEDURE — 99395 PREV VISIT EST AGE 18-39: CPT | Performed by: OBSTETRICS & GYNECOLOGY

## 2019-01-07 PROCEDURE — G0145 SCR C/V CYTO,THINLAYER,RESCR: HCPCS | Performed by: OBSTETRICS & GYNECOLOGY

## 2019-01-07 ASSESSMENT — ANXIETY QUESTIONNAIRES
GAD7 TOTAL SCORE: 0
2. NOT BEING ABLE TO STOP OR CONTROL WORRYING: NOT AT ALL
3. WORRYING TOO MUCH ABOUT DIFFERENT THINGS: NOT AT ALL
5. BEING SO RESTLESS THAT IT IS HARD TO SIT STILL: NOT AT ALL
7. FEELING AFRAID AS IF SOMETHING AWFUL MIGHT HAPPEN: NOT AT ALL
1. FEELING NERVOUS, ANXIOUS, OR ON EDGE: NOT AT ALL
6. BECOMING EASILY ANNOYED OR IRRITABLE: NOT AT ALL

## 2019-01-07 ASSESSMENT — PATIENT HEALTH QUESTIONNAIRE - PHQ9
5. POOR APPETITE OR OVEREATING: NOT AT ALL
SUM OF ALL RESPONSES TO PHQ QUESTIONS 1-9: 0

## 2019-01-07 ASSESSMENT — MIFFLIN-ST. JEOR: SCORE: 1267.03

## 2019-01-07 NOTE — PROGRESS NOTES
Kaia is a 38 year old female, , who is here for her annual exam.  She is using rhythm for contraception and would be fine with a fourth pregnancy.  However, due to a trip to [place] later this month, she would like to start taking a BCP although she knows that this won't ensure that she won't get a menses during her vacation.  She is not in a fasting state this morning so prefers to return for needed labwork.  She already received a flu vaccine at work.  She is s/p cryotherapy at age 18 with normal pap smear results subsequently.     ROS: Ten point review of systems was reviewed and negative except the above.    Health Maintenance   Topic Date Due     INFLUENZA VACCINE (1) 2018     PHQ-2 Q1 YR  2019     PAP SCREENING Q3 YR (SYSTEM ASSIGNED)  2019     DTAP/TDAP/TD IMMUNIZATION (2 - Td) 2023     ZOSTER IMMUNIZATION (1 of 2) 10/03/2030     HIV SCREEN (SYSTEM ASSIGNED)  Completed     IPV IMMUNIZATION  Aged Out     MENINGITIS IMMUNIZATION  Aged Out      Last pap: normal pap on 16  Last Mammogram: not due  Last Dexa: not due  Last Colonoscopy: not due  Lab Results   Component Value Date    CHOL 170 2016     Lab Results   Component Value Date    HDL 70 2016     Lab Results   Component Value Date    LDL 88 2016     Lab Results   Component Value Date    TRIG 61 2016     No results found for: CHOLHDLRATIO      OBHX:      PSH:   Past Surgical History:   Procedure Laterality Date     APPENDECTOMY      13 y/o      SECTION  3/19/2013    Procedure:  SECTION;;  Surgeon: Maddie Stallworth MD;  [facility]     HYSTEROSCOPY,DIAGNOSTIC  2008     REMV PILONIDAL LESION SIMPLE  10/20/2005         PMH: Her past medical, surgical, and obstetric histories were reviewed and are documented in their appropriate chart areas.    ALL/Meds: Her medication and allergy histories were reviewed and are documented in their appropriate chart areas.    SH/FMH: Her  "social and family history was reviewed and documented in its appropriate chart area.    PE: /77   Pulse 81   Ht 1.67 m (5' 5.75\")   Wt 57.4 kg (126 lb 9.6 oz)   LMP 12/24/2018 (Exact Date)   SpO2 99%   Breastfeeding? No   BMI 20.59 kg/m    Body mass index is 20.59 kg/m .    General Appearance:  healthy, alert, active, no distress  Cardiovascular:  Regular rate and Rhythm without murmur  Neck: Supple, no adenopathy and thyroid normal  Lungs:  Clear, without wheeze, rale or rhonchi  Breast: normal breast exam with bilateral silicone implants  Abdomen: Benign, Soft, flat, non-tender, No masses, organomegaly, No inguinal nodes and Bowel sounds normoactive.   Pelvic:       - Ext: Vulva and perineum are normal without lesion, mass or discharge        - Urethra: normal without discharge        - Urethral Meatus: normal appearance       - Bladder: no tenderness, no masses       - Vagina: Normal mucosa, no discharge        - Cervix: normal and multiparous       - Uterus:Normal shape, position and consistency        - Adnexa: Normal without masses or tenderness       - Rectal: deferred    A/P:  Well Woman Exam, BCP new start     -  I discussed the new pap recommendations regarding screening.  Explained the rationale for increased intervals between paps.  Questions asked and answered.  She does agree to this regiment.  Pap was obtained and submitted.  The patient had cryotherapy of her cervix at age 18 with normal paps since then.   -  BC: She would like to start taking the BCP for contraception and to try and avoid a menses during her upcoming vacation to [place] later this months.  Instructions on use were reviewed with the patient and she understands that it may not prevent a menses during her trip due to her late start.   -  Future orders placed for fasting labwork including a lipid profile, glucose, TSH/free T4, and vitamin D   -  She will need a mammogram at age 40 - note bilateral breast implants " (silicone)   -  Her mother was diagnosed with uterine cancer so the patient will check to see if genetic testing is advised   -  She already received a flu vaccine at work    Orders Placed This Encounter   Procedures     Pap imaged thin layer screen with HPV - recommended age 30 - 65 years (select HPV order below)     HPV High Risk Types DNA Cervical     Glucose     TSH with free T4 reflex     Lipid Profile (Chol, Trig, HDL, LDL calc)     Vitamin D Deficiency      -  Encouraged self-breast exam   -  Encouraged low fat diet, regular exercise, and adequate calcium intake.    Ann Ortiz,   FACOG, FACS

## 2019-01-07 NOTE — PATIENT INSTRUCTIONS
If you have any questions regarding your visit, Please contact your care team.    [facility]: [phone]      [facility] CLINIC HOURS TELEPHONE NUMBER   Ann Ortiz DO.    JAMIL Gill -    GABRIELA Crook       Monday, Wednesday, Thursday and Friday, [place]  8:30a.m-5:00 p.m   [facility]  [address] [place] [address]  [phone] ask for [facility]    Imaging Riyogovgyw-[phone]       Urgent Care locations:    [facility] Saturday and Sunday   9 am - 5 pm    Monday-Friday   12 pm - 8 pm  Saturday and Sunday   9 am - 5 pm   [phone]     [facility] Labor and Delivery:  [phone]    If you need a medication refill, please contact your pharmacy. Please allow 3 business days for your refill to be completed.  As always, Thank you for trusting us with your healthcare needs!

## 2019-01-09 ASSESSMENT — ANXIETY QUESTIONNAIRES: GAD7 TOTAL SCORE: 0

## 2019-01-10 LAB
COPATH REPORT: NORMAL
PAP: NORMAL

## 2019-01-11 LAB
FINAL DIAGNOSIS: NORMAL
HPV HR 12 DNA CVX QL NAA+PROBE: NEGATIVE
HPV16 DNA SPEC QL NAA+PROBE: NEGATIVE
HPV18 DNA SPEC QL NAA+PROBE: NEGATIVE
SPECIMEN DESCRIPTION: NORMAL
SPECIMEN SOURCE CVX/VAG CYTO: NORMAL

## 2019-02-13 ENCOUNTER — OFFICE VISIT (OUTPATIENT)
Dept: FAMILY MEDICINE | Facility: CLINIC | Age: 39
End: 2019-02-13
Payer: COMMERCIAL

## 2019-02-13 VITALS
OXYGEN SATURATION: 100 % | SYSTOLIC BLOOD PRESSURE: 122 MMHG | TEMPERATURE: 98.3 F | RESPIRATION RATE: 18 BRPM | HEIGHT: 66 IN | BODY MASS INDEX: 20.75 KG/M2 | DIASTOLIC BLOOD PRESSURE: 74 MMHG | HEART RATE: 77 BPM | WEIGHT: 129.1 LBS

## 2019-02-13 DIAGNOSIS — R07.0 THROAT PAIN: Primary | ICD-10-CM

## 2019-02-13 LAB
DEPRECATED S PYO AG THROAT QL EIA: NORMAL
SPECIMEN SOURCE: NORMAL

## 2019-02-13 PROCEDURE — 87880 STREP A ASSAY W/OPTIC: CPT | Performed by: NURSE PRACTITIONER

## 2019-02-13 PROCEDURE — 99213 OFFICE O/P EST LOW 20 MIN: CPT | Performed by: NURSE PRACTITIONER

## 2019-02-13 PROCEDURE — 87081 CULTURE SCREEN ONLY: CPT | Performed by: NURSE PRACTITIONER

## 2019-02-13 ASSESSMENT — MIFFLIN-ST. JEOR: SCORE: 1278.37

## 2019-02-13 ASSESSMENT — PAIN SCALES - GENERAL: PAINLEVEL: MILD PAIN (2)

## 2019-02-13 NOTE — PROGRESS NOTES
SUBJECTIVE:   Kaia Smith is a 38 year old female who presents to clinic today for the following health issues:      Acute Illness   Acute illness concerns: Sore throat  Onset: yesterday    Fever: no    Chills/Sweats: no    Headache (location?): YES    Sinus Pressure:no    Conjunctivitis:  no    Ear Pain: YES: bilateral    Rhinorrhea: no    Congestion: no    Sore Throat: no     Cough: no    Wheeze: no    Decreased Appetite: no    Nausea: no    Vomiting: no    Diarrhea:  no    Dysuria/Freq.: no    Fatigue/Achiness: YES    Sick/Strep Exposure: YES     Therapies Tried and outcome: nothing    Multiple other people have strep 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  Additional history: as documented    Patient Active Problem List   Diagnosis     CARDIOVASCULAR SCREENING; LDL GOAL LESS THAN 160     General counseling for prescription of oral contraceptives     Abdominal pain, right upper quadrant     H/O abnormal cervical Papanicolaou smear     Past Surgical History:   Procedure Laterality Date     APPENDECTOMY      13 y/o      SECTION  3/19/2013    Procedure:  SECTION;;  Surgeon: Maddie Stallworth MD;  [facility]     COSMETIC MAMMOPLASTY AUGMENTATION BILATERAL Bilateral 2016    Silicone     CRYOCAUTERY OF CERVIX N/A age 18     HYSTEROSCOPY,DIAGNOSTIC  2008     REMV PILONIDAL LESION SIMPLE  10/20/2005       Social History     Tobacco Use     Smoking status: Never Smoker     Smokeless tobacco: Never Used   Substance Use Topics     Alcohol use: Yes     Alcohol/week: 0.0 oz     Comment: occasionally     Family History   Problem Relation Age of Onset     Cancer Father         lymphoma and bladder     Hypertension Father      Alzheimer Disease Maternal Grandmother      Cerebrovascular Disease Maternal Grandmother      Alzheimer Disease Paternal Grandfather      Hypertension Mother      Cancer Mother         uterine      "Diabetes No family hx of      Coronary Artery Disease No family hx of      Hyperlipidemia No family hx of      Breast Cancer No family hx of      Colon Cancer No family hx of      Prostate Cancer No family hx of      Anxiety Disorder No family hx of      Depression No family hx of      Thyroid Disease No family hx of      Genetic Disorder No family hx of          No current outpatient medications on file.     No Known Allergies    Reviewed and updated as needed this visit by clinical staff  Tobacco  Allergies  Meds  Problems  Med Hx  Surg Hx  Fam Hx  Soc Hx        Reviewed and updated as needed this visit by Provider  Tobacco  Allergies  Meds  Problems  Med Hx  Surg Hx  Fam Hx         ROS:  Constitutional, HEENT-as above, cardiovascular, pulmonary, gi and gu systems are negative, except as otherwise noted.    OBJECTIVE:     /74 (BP Location: Right arm, Patient Position: Sitting, Cuff Size: Adult Regular)   Pulse 77   Temp 98.3  F (36.8  C) (Oral)   Resp 18   Ht 1.67 m (5' 5.75\")   Wt 58.6 kg (129 lb 1.6 oz)   LMP 01/13/2019   SpO2 100%   BMI 21.00 kg/m    Body mass index is 21 kg/m .  GENERAL: healthy, alert and no distress  EYES: Eyes grossly normal to inspection, PERRL and conjunctivae and sclerae normal  HENT: ear canals and TM's normal, nose and mouth without ulcers or lesions, posterior pharynx slight erythema, no exudate noted  NECK: no adenopathy, no asymmetry, masses, or scars and thyroid normal to palpation  RESP: lungs clear to auscultation - no rales, rhonchi or wheezes  CV: regular rate and rhythm, normal S1 S2, no S3 or S4, no murmur, click or rub    Diagnostic Test Results:  Results for orders placed or performed in visit on 02/13/19 (from the past 24 hour(s))   Strep, Rapid Screen   Result Value Ref Range    Specimen Description Throat     Rapid Strep A Screen       NEGATIVE: No Group A streptococcal antigen detected by immunoassay, await culture report. "       ASSESSMENT/PLAN:         1. Throat pain  Negative strep.  Continue supportive care  - Strep, Rapid Screen  - Beta strep group A culture    FUTURE APPOINTMENTS:       - Follow-up visit in if not improving the next 7 days  This chart was documented by provider using a voice activated software called Dragon in addition to manual typing. There may be vocabulary errors or other grammatical errors due to this.       THAO Torres, NP-C  [facility]

## 2019-02-14 LAB
BACTERIA SPEC CULT: NORMAL
SPECIMEN SOURCE: NORMAL

## 2019-05-07 ENCOUNTER — OFFICE VISIT (OUTPATIENT)
Dept: DERMATOLOGY | Facility: CLINIC | Age: 39
End: 2019-05-07
Payer: COMMERCIAL

## 2019-05-07 DIAGNOSIS — Z86.018 HISTORY OF DYSPLASTIC NEVUS: Primary | ICD-10-CM

## 2019-05-07 DIAGNOSIS — D23.9 DYSPLASTIC NEVUS: ICD-10-CM

## 2019-05-07 DIAGNOSIS — D22.9 MULTIPLE BENIGN NEVI: ICD-10-CM

## 2019-05-07 PROCEDURE — 88305 TISSUE EXAM BY PATHOLOGIST: CPT | Mod: TC | Performed by: DERMATOLOGY

## 2019-05-07 PROCEDURE — 11302 SHAVE SKIN LESION 1.1-2.0 CM: CPT | Performed by: DERMATOLOGY

## 2019-05-07 ASSESSMENT — PAIN SCALES - GENERAL: PAINLEVEL: NO PAIN (0)

## 2019-05-07 NOTE — NURSING NOTE
Kaia Smith's goals for this visit include:   Chief Complaint   Patient presents with     Skin Check     no areas of concern hx DN       She requests these members of her care team be copied on today's visit information:     PCP: [facility]    Referring Provider:  No referring provider defined for this encounter.    There were no vitals taken for this visit.    Do you need any medication refills at today's visit? Kat Gordon LPN

## 2019-05-07 NOTE — LETTER
2019         RE: Kaia Smith  [address] [facility] [address]        Dear Colleague,    Thank you for referring your patient, Kaia Smith, to the [facility]. Please see a copy of my visit note below.    [facility] Dermatology Note      Dermatology Problem List:  1. Compound nevus with mild dysplasia, right lower back with recurrence peripherally.  -s/p biopsy 2016, s/p shave bx 19    Last TBSE: 2016    Encounter Date: May 7, 2019    CC:  Chief Complaint   Patient presents with     Skin Check     no areas of concern hx DN       History of Present Illness:  Ms. Kaia Smith is a 38 year old female who presents for a skin check. Patient was last seen 3/09/19 for recurrent mildly dysplastic nevus. The patient reports no spots that are bleeding crusting or changing. She has no specific concerns. No changes in medical problems.     Past Medical History:   Patient Active Problem List   Diagnosis     CARDIOVASCULAR SCREENING; LDL GOAL LESS THAN 160     General counseling for prescription of oral contraceptives     Abdominal pain, right upper quadrant     H/O abnormal cervical Papanicolaou smear     Past Medical History:   Diagnosis Date     Acute mastitis of left breast 2014     H/O abnormal cervical Papanicolaou smear 1999     Menarche     11 y/o     Traumatic injury during pregnancy 2013     Past Surgical History:   Procedure Laterality Date     APPENDECTOMY      13 y/o      SECTION  3/19/2013    Procedure:  SECTION;;  Surgeon: Maddie Stallworth MD;  [facility]     COSMETIC MAMMOPLASTY AUGMENTATION BILATERAL Bilateral 2016    Silicone     CRYOCAUTERY OF CERVIX N/A age 18     HYSTEROSCOPY,DIAGNOSTIC  2008     REMV PILONIDAL LESION SIMPLE  10/20/2005     Social History:  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    Kept in chart for convenience.        Family History:  There is no family history of skin cancer.  Kept in chart for convenience.       Medications:  No current outpatient medications on file.     No Known Allergies    Review of Systems:  -Skin: As per HPI.   -Const: Feeling generally well. Not pregnant or breastfeeding.     Physical exam:  Vitals: There were no vitals taken for this visit.  GEN: This is a well developed, well-nourished female in no acute distress, in a pleasant mood.    SKIN: Total skin excluding the undergarment areas was performed. The exam included the head/face, neck, both arms, chest, back, abdomen, both legs, digits and/or nails. Declines genital and buttock exam.   - Re-pigmentation within the scar on the right lower back, unchanged from photo   - Scattered brown macules on sun exposed areas.  - Multiple benign nevi on the scalp and neck   - No other lesions of concern on areas examined.     Impression/Plan:  1. Recurrent nevus at the site of a previously biopsied mild dysplastic nevus of the right lower back, s/p punch biopsy 9/27/2016. Se elects for removal today Lesion is stable. No evidence of atypical repigmetnation    Last TBSE 5/2019 and normal    Compared to previous photos and appearance seems unchanged. Reviewed risks and benefits of removal. Reviewed that lesion has recurred through path suggests it is not a skin cancer.     Photo taken again today.     Discussed treatment options including biopsy today, referral to Mohs, and observation. Patient elects for biopsy.    After discussion of benefits and risks including but not limited to bleeding, infection, scar, incomplete removal, recurrence, and non-diagnostic biopsy, written consent and photographs were obtained. The area was cleaned with isopropyl alcohol. 0.5 mL of 1% lidocaine with epinephrine was injected to obtain adequate anesthesia of the lesion on the right lower back. A shave removal measuring 1.4 cm was performed. Hemostasis was achieved with  aluminium chloride. Vaseline and a sterile dressing were applied. The patient tolerated the procedure and no complications were noted. The patient was provided with verbal and written post care instructions.     ABCD's of melanoma were reviewed with patient and handout provided.     Sun protection of 30 SPF or higher. Zinc oxide recommended    REcommend nail check when changing polish color, she has nail polish today, refused genital exam  2. Solar lentigines and multiple benign nevi     No further intervention required at this time.     Follow up in 9 months for recheck of nevus, earlier for new or changing lesions.     Staff Involved:  Scribe/Staff    Scribe Disclosure:   I, Silvano Cornejo, am serving as a scribe to document services personally performed by Dr. Ellen Okeefe, based on data collection and the provider's statements to me.       Provider Disclosure:   The documentation recorded by the scribe accurately reflects the services I personally performed and the decisions made by me.    Ellen Okeefe MD    Department of Dermatology  [facility]: Phone: [phone], Fax:[phone]  [facility]: Phone: [phone], Fax: [phone]        Again, thank you for allowing me to participate in the care of your patient.        Sincerely,        Ellen Okeefe MD

## 2019-05-07 NOTE — PATIENT INSTRUCTIONS
Wound Care Instructions  I will experience scar, altered skin color, bleeding, swelling, pain, crusting and redness. I may experience altered sensation. Risks are excessive bleeding, infection, muscle weakness, thick (hypertrophic or keloidal) scar, and recurrence,. A second procedure may be recommended to obtain the best cosmetic or functional result.  Possible complications of any surgical procedure are bleeding, infection, scarring, alteration in skin color and sensation, muscle weakness in the area, wound dehiscence or seperation, or recurrence of the lesion or disease. On occasion, after healing, a secondary procedure or revision may be recommended in order to obtain the best cosmetic or functional result.   After your surgery, a pressure bandage will be placed over the area that has sutures. This will help prevent bleeding.    For the First 24 hours After Surgery:  1. Leave the pressure bandage on and keep it dry. If it should come loose, you may retape it, but do not take it off.  2. Relax and take it easy. Do not do any vigorous exercise, heavy lifting, or bending forward. This could cause the wound to bleed.  3. Post-operative pain is usually mild. You may take plain or extra strength Tylenol every 4 hours as needed (do not take more than 4,000mg in one day). Do not take any medicine that contains aspirin, ibuprofen or motrin unless you have been recommended these by a doctor.  Avoid alcohol and vitamin E as these may increase your tendency to bleed.  4. You may put an ice pack around the bandaged area for 20 minutes every 2-3 hours. This may help reduce swelling, bruising, and pain. Make sure the ice pack is waterproof so that the pressure bandage does not get wet.   5. You may see a small amount of drainage or blood on your pressure bandage. This is normal. However, if drainage or bleeding continues or saturates the bandage, you will need to apply firm pressure over the bandage with a washcloth for 15  minutes. If bleeding continues after applying pressure for 15 minutes then go to the nearest emergency room.  48 Hours After Surgery  Carefully remove the bandage and start daily wound care and dressing changes. You may also now shower and get the wound wet. Wash wound with a mild soap and water.  Use caution when washing the wound. Be gentle and do not let the forceful shower stream hit the wound directly.  PAT dry.  Daily Wound Care:  1. Wash wound with a mild soap and water.  Use caution when washing the wound, be gentle and do not let the forceful shower stream hit the wound directly.  2. PAT DRY.  3. Apply Vaseline (from a new container or tube) over the suture line with a Q-tip. It is very important to keep the wound continuously moist, as wounds heal best in a moist environment.  4.  Keep the site covered until sutures are removed, you can cover it with a Telfa (non-stick) dressing and tape or a band-aid.    5. If you are unable to keep wound covered, you must apply Vaseline every 2 - 3 hours (while awake) to ensure it is being kept moist for optimal healing. A dressing overnight is recommended to keep the area moist.   Call Us If:  1. You have pain that is not controlled with Tylenol.  2. You have signs or symptoms of an infection, such as: fever over 100 degrees F, redness, warmth, or foul-smelling or yellow/creamy drainage from the wound.  Who should I call with questions?    [facility]: [phone]     [facility]: [phone]    For urgent needs outside of business hours call the [facility] at [phone] and ask for the dermatology resident on call

## 2019-05-07 NOTE — PROGRESS NOTES
[facility] Dermatology Note      Dermatology Problem List:  1. Compound nevus with mild dysplasia, right lower back with recurrence peripherally.  -s/p biopsy 2016, s/p shave bx 19    Last TBSE: 2016    Encounter Date: May 7, 2019    CC:  Chief Complaint   Patient presents with     Skin Check     no areas of concern hx DN       History of Present Illness:  Ms. Kaia Smith is a 38 year old female who presents for a skin check. Patient was last seen 3/09/19 for recurrent mildly dysplastic nevus. The patient reports no spots that are bleeding crusting or changing. She has no specific concerns. No changes in medical problems.     Past Medical History:   Patient Active Problem List   Diagnosis     CARDIOVASCULAR SCREENING; LDL GOAL LESS THAN 160     General counseling for prescription of oral contraceptives     Abdominal pain, right upper quadrant     H/O abnormal cervical Papanicolaou smear     Past Medical History:   Diagnosis Date     Acute mastitis of left breast 2014     H/O abnormal cervical Papanicolaou smear 1999     Menarche     11 y/o     Traumatic injury during pregnancy 2013     Past Surgical History:   Procedure Laterality Date     APPENDECTOMY      15 y/o      SECTION  3/19/2013    Procedure:  SECTION;;  Surgeon: Maddie Stallworth MD;  [facility]     COSMETIC MAMMOPLASTY AUGMENTATION BILATERAL Bilateral 2016    Silicone     CRYOCAUTERY OF CERVIX N/A age 18     HYSTEROSCOPY,DIAGNOSTIC  2008     REMV PILONIDAL LESION SIMPLE  10/20/2005     Social History:  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    Kept in chart for convenience.       Family History:  There is no family history of skin cancer.  Kept in chart for convenience.       Medications:  No current outpatient medications on file.     No Known Allergies    Review of Systems:  -Skin: As per HPI.   -Const: Feeling generally well.  Not pregnant or breastfeeding.     Physical exam:  Vitals: There were no vitals taken for this visit.  GEN: This is a well developed, well-nourished female in no acute distress, in a pleasant mood.    SKIN: Total skin excluding the undergarment areas was performed. The exam included the head/face, neck, both arms, chest, back, abdomen, both legs, digits and/or nails. Declines genital and buttock exam.   - Re-pigmentation within the scar on the right lower back, unchanged from photo   - Scattered brown macules on sun exposed areas.  - Multiple benign nevi on the scalp and neck   - No other lesions of concern on areas examined.     Impression/Plan:  1. Recurrent nevus at the site of a previously biopsied mild dysplastic nevus of the right lower back, s/p punch biopsy 9/27/2016. Seh elects for removal today Lesion is stable. No evidence of atypical repigmetnation    Last TBSE 5/2019 and normal    Compared to previous photos and appearance seems unchanged. Reviewed risks and benefits of removal. Reviewed that lesion has recurred through path suggests it is not a skin cancer.     Photo taken again today.     Discussed treatment options including biopsy today, referral to Mohs, and observation. Patient elects for biopsy.    After discussion of benefits and risks including but not limited to bleeding, infection, scar, incomplete removal, recurrence, and non-diagnostic biopsy, written consent and photographs were obtained. The area was cleaned with isopropyl alcohol. 0.5 mL of 1% lidocaine with epinephrine was injected to obtain adequate anesthesia of the lesion on the right lower back. A shave removal measuring 1.4 cm was performed. Hemostasis was achieved with aluminium chloride. Vaseline and a sterile dressing were applied. The patient tolerated the procedure and no complications were noted. The patient was provided with verbal and written post care instructions.     ABCD's of melanoma were reviewed with patient and  handout provided.     Sun protection of 30 SPF or higher. Zinc oxide recommended    REcommend nail check when changing polish color, she has nail polish today, refused genital exam  2. Solar lentigines and multiple benign nevi     No further intervention required at this time.     Follow up in 9 months for recheck of nevus, earlier for new or changing lesions.     Staff Involved:  Scribe/Staff    Scribe Disclosure:   I, Silvano Cornejo, am serving as a scribe to document services personally performed by Dr. Ellen Okeefe, based on data collection and the provider's statements to me.       Provider Disclosure:   The documentation recorded by the scribe accurately reflects the services I personally performed and the decisions made by me.    Ellen Okeefe MD    Department of Dermatology  [facility] [place]: Phone: [phone], Fax:[phone]  [facility]: Phone: [phone], Fax: [phone]

## 2019-05-07 NOTE — NURSING NOTE
The following medication was given:     MEDICATION:  Lidocaine with epinephrine 1% [id number]  ROUTE: SQ  SITE: see procedure note  DOSE: 2cc  LOT #: -[id number]  : [employer]osp[employer]  EXPIRATION DATE: 1Dec2019  NDC#: 0491-5012-52   Was there drug waste? No  Multi-dose vial: Yes    Jacy Myers LPN  May 7, 2019

## 2019-05-10 LAB — COPATH REPORT: NORMAL

## 2019-06-18 ENCOUNTER — TELEPHONE (OUTPATIENT)
Dept: OBGYN | Facility: CLINIC | Age: 39
End: 2019-06-18

## 2019-06-18 NOTE — TELEPHONE ENCOUNTER
[facility] Call Center    Phone Message    May a detailed message be left on voicemail: yes    Reason for Call: Other: [facility] request [facility] and would like a call back to discuss. Please advise.         Action Taken: Message routed to:  [facility] p [phone]

## 2019-06-18 NOTE — TELEPHONE ENCOUNTER
Referral for [facility] for some varicose's pt would like removed. Pt would like us to fax over a referral to [phone].   Routing to ale.     Divya Hobbs RN on 6/18/2019 at 1:10 PM

## 2019-06-18 NOTE — TELEPHONE ENCOUNTER
Writer returning call to pt. Will route to provider for referral [facility].     Divya Hobbs RN on 6/18/2019 at 12:37 PM

## 2019-06-19 ENCOUNTER — MEDICAL CORRESPONDENCE (OUTPATIENT)
Dept: HEALTH INFORMATION MANAGEMENT | Facility: CLINIC | Age: 39
End: 2019-06-19

## 2019-06-19 DIAGNOSIS — I83.90 VARICOSE VEINS OF LOWER EXTREMITY, UNSPECIFIED LATERALITY, UNSPECIFIED WHETHER COMPLICATED: Primary | ICD-10-CM

## 2019-06-19 NOTE — TELEPHONE ENCOUNTER
Ann Ortiz, DO  [facility] Triage 1 minute ago (2:33 PM)      Please let her know that this referral has been sent to [facility] but verify the location of these varicosities since I don't find this info in the notes below.      Unable to reach patient via phone. Left message to call clinic back.   When pt returns call please let her know Dr. Ortiz has sent a referral to [facility] and also ask where her varicosities are.    Pauline Cantu RN

## 2019-06-19 NOTE — TELEPHONE ENCOUNTER
Patient informed that this referral has been sent.  The varicosities are mainly in her right leg.  Referral was faxed to [facility].  Bridget Shah CMA  June 19, 2019 3:34 PM

## 2019-07-08 ENCOUNTER — TELEPHONE (OUTPATIENT)
Dept: OBGYN | Facility: CLINIC | Age: 39
End: 2019-07-08

## 2019-07-08 NOTE — TELEPHONE ENCOUNTER
[facility] Call Center    Phone Message    May a detailed message be left on voicemail: no    Reason for Call: Patient called and requested referral for VASCULAR SURGERY REFERRAL for Kaia Smith [[phone]] (Routine) to be sent to her insurance company [employer]. Please send referral to insurance company.      Action Taken: Message routed to:  [facility] p [phone]

## 2019-07-09 NOTE — TELEPHONE ENCOUNTER
Patient called back and stated that she was very irritated that we cannot just send the referral through electronic system. Patient also stated that we are two large [employer] ans that it is ridiculous that we cannot do that.      I did let patient know that we have not done that before and I did not know that was an option. Our referrals team possibly may do that but I don't work with them and would not know that.     patient was very mad sounding on the phone while giving the fax number    Madison Reed  [facility]

## 2019-07-09 NOTE — TELEPHONE ENCOUNTER
Called out to patient to get fax # to send referral. Patient will call us back once she has it    Madison Reed  [facility]

## 2019-07-17 ENCOUNTER — TELEPHONE (OUTPATIENT)
Dept: OBGYN | Facility: CLINIC | Age: 39
End: 2019-07-17

## 2019-07-17 NOTE — TELEPHONE ENCOUNTER
I called patient as I need a fax number to fax the referral to her insurance.  Patient is very upset as she thought this was already taken care of and we should be able to send this electronically.  Patient states that she has already gave a fax # to our  Madison and was told that she was taken care of this.  I don't see any documentation that this was done.  Patient would like to talk to a supervisor.  I transferred patient to supervisor, Yakelin Berry.  Patient will call me back with fax#.  Bridget Shah, [facility]  July 17, 2019 4:50 PM

## 2019-07-17 NOTE — TELEPHONE ENCOUNTER
[facility] Call Center    Phone Message    May a detailed message be left on voicemail: yes    Reason for Call: Other: [facility] referral needs to be sent to [facility] one insurance company for this patient. Patient would like this pushed electronically if possible. Patient states is very upset that this has not been done yet. Please advise.     Action Taken: Message routed to:  [facility] p [phone]

## 2019-07-18 ENCOUNTER — TELEPHONE (OUTPATIENT)
Dept: PEDIATRICS | Facility: CLINIC | Age: 39
End: 2019-07-18

## 2019-07-18 NOTE — TELEPHONE ENCOUNTER
I called the insurance company.  [facility] is in [employer] network, not [employer] network.  So for a claim to be paid patient needs an insurance referral so they know care was directed by a provider.    Her insurance plan, though, requires insurance referrals to come from the primary care clinic. P1 said patient's PCC choice for 2019 was [facility]. I talked with Dr. Aceves's office to see if they could generate the referral, for customer service reasons as the patient has been waiting.    Called patient to let her know what work had been done thus far and that I would keep her in the loop with what I knew from the FP office.

## 2019-07-18 NOTE — TELEPHONE ENCOUNTER
I am not PCP of this patient.  I had seen her for acute viral upper respiratory infection in September 2018.  Had never evaluated her for varicose veins and hence cannot provide a referral for that condition.  Please inform patient.

## 2019-07-18 NOTE — TELEPHONE ENCOUNTER
Yakelin Berry, OB RN calls stating:   Patient called and requested referral for VASCULAR SURGERY REFERRAL for varicose veins to be sent to her insurance company [employer]. It was ordered on 6/19, but [facility] is not in [facility] preferred network so she needs a insurance referral and it needs to come from a primary care provider. It has been over a month since she requested this. She denies going within our network.     LOV- 9/18 for an acute visit.   Please route back to Yakelin Berry or Misty Lundy to f/u with patient.  Carola Foster, RN

## 2019-07-22 NOTE — TELEPHONE ENCOUNTER
Spoke with patient.  Understand referral needs to come from her PCP.      Has appt set up for 7/29.

## 2019-07-29 ENCOUNTER — OFFICE VISIT (OUTPATIENT)
Dept: PEDIATRICS | Facility: CLINIC | Age: 39
End: 2019-07-29
Payer: COMMERCIAL

## 2019-07-29 VITALS
OXYGEN SATURATION: 99 % | HEART RATE: 67 BPM | WEIGHT: 129.9 LBS | DIASTOLIC BLOOD PRESSURE: 67 MMHG | SYSTOLIC BLOOD PRESSURE: 100 MMHG | BODY MASS INDEX: 21.13 KG/M2 | TEMPERATURE: 97.8 F

## 2019-07-29 DIAGNOSIS — Z13.220 LIPID SCREENING: ICD-10-CM

## 2019-07-29 DIAGNOSIS — I83.811 VARICOSE VEINS OF RIGHT LOWER EXTREMITY WITH PAIN: Primary | ICD-10-CM

## 2019-07-29 DIAGNOSIS — Z13.29 SCREENING FOR THYROID DISORDER: ICD-10-CM

## 2019-07-29 DIAGNOSIS — E55.9 VITAMIN D DEFICIENCY: ICD-10-CM

## 2019-07-29 DIAGNOSIS — Z13.1 SCREENING FOR DIABETES MELLITUS: ICD-10-CM

## 2019-07-29 LAB
CHOLEST SERPL-MCNC: 178 MG/DL
GLUCOSE SERPL-MCNC: 98 MG/DL (ref 70–99)
HDLC SERPL-MCNC: 77 MG/DL
LDLC SERPL CALC-MCNC: 87 MG/DL
NONHDLC SERPL-MCNC: 101 MG/DL
TRIGL SERPL-MCNC: 72 MG/DL
TSH SERPL DL<=0.005 MIU/L-ACNC: 1.54 MU/L (ref 0.4–4)

## 2019-07-29 PROCEDURE — 80061 LIPID PANEL: CPT | Performed by: OBSTETRICS & GYNECOLOGY

## 2019-07-29 PROCEDURE — 82306 VITAMIN D 25 HYDROXY: CPT | Performed by: OBSTETRICS & GYNECOLOGY

## 2019-07-29 PROCEDURE — 84443 ASSAY THYROID STIM HORMONE: CPT | Performed by: OBSTETRICS & GYNECOLOGY

## 2019-07-29 PROCEDURE — 36415 COLL VENOUS BLD VENIPUNCTURE: CPT | Performed by: OBSTETRICS & GYNECOLOGY

## 2019-07-29 PROCEDURE — 82947 ASSAY GLUCOSE BLOOD QUANT: CPT | Performed by: OBSTETRICS & GYNECOLOGY

## 2019-07-29 PROCEDURE — 99213 OFFICE O/P EST LOW 20 MIN: CPT | Performed by: FAMILY MEDICINE

## 2019-07-29 ASSESSMENT — PAIN SCALES - GENERAL: PAINLEVEL: NO PAIN (0)

## 2019-07-29 NOTE — PROGRESS NOTES
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        Patient Active Problem List   Diagnosis     CARDIOVASCULAR SCREENING; LDL GOAL LESS THAN 160     General counseling for prescription of oral contraceptives     Abdominal pain, right upper quadrant     H/O abnormal cervical Papanicolaou smear     History of dysplastic nevus     Past Surgical History:   Procedure Laterality Date     APPENDECTOMY      13 y/o      SECTION  3/19/2013    Procedure:  SECTION;;  Surgeon: Maddie Stallworth MD;  [facility]     COSMETIC MAMMOPLASTY AUGMENTATION BILATERAL Bilateral 2016    Silicone     CRYOCAUTERY OF CERVIX N/A age 18     HYSTEROSCOPY,DIAGNOSTIC  2008     REMV PILONIDAL LESION SIMPLE  10/20/2005       Social History     Tobacco Use     Smoking status: Never Smoker     Smokeless tobacco: Never Used   Substance Use Topics     Alcohol use: Yes     Alcohol/week: 0.0 oz     Comment: occasionally     Family History   Problem Relation Age of Onset     Cancer Father         lymphoma and bladder     Hypertension Father      Alzheimer Disease Maternal Grandmother      Cerebrovascular Disease Maternal Grandmother      Alzheimer Disease Paternal Grandfather      Hypertension Mother      Cancer Mother         uterine     Diabetes No family hx of       Coronary Artery Disease No family hx of      Hyperlipidemia No family hx of      Breast Cancer No family hx of      Colon Cancer No family hx of      Prostate Cancer No family hx of      Anxiety Disorder No family hx of      Depression No family hx of      Thyroid Disease No family hx of      Genetic Disorder No family hx of          No current outpatient medications on file.     No Known Allergies  Recent Labs   Lab Test 06/20/16  0947 02/08/14  1714   LDL 88  --    HDL 70  --    TRIG 61  --    CR 0.62 0.60   GFRESTIMATED >90  Non  GFR Calc   >90   GFRESTBLACK >90   GFR Calc   >90   POTASSIUM 4.1  --       BP Readings from Last 3 Encounters:   07/29/19 100/67   02/13/19 122/74   01/07/19 114/77    Wt Readings from Last 3 Encounters:   07/29/19 58.9 kg (129 lb 14.4 oz)   02/13/19 58.6 kg (129 lb 1.6 oz)   01/07/19 57.4 kg (126 lb 9.6 oz)                      Reviewed and updated as needed this visit by Provider         Review of Systems   ROS COMP: CONSTITUTIONAL: NEGATIVE for fever, chills, change in weight  INTEGUMENTARY/SKIN: NEGATIVE for worrisome rashes, moles or lesions  MUSCULOSKELETAL: as above      Objective    /67 (BP Location: Right arm, Patient Position: Sitting, Cuff Size: Adult Regular)   Pulse 67   Temp 97.8  F (36.6  C) (Oral)   Wt 58.9 kg (129 lb 14.4 oz)   LMP 07/27/2019 (Exact Date)   SpO2 99%   BMI 21.13 kg/m    Body mass index is 21.13 kg/m .  Physical Exam   GENERAL: healthy, alert and no distress  MS: Nontender, varicosity of veins on the  entire right leg  SKIN: no suspicious lesions or rashes  PSYCH: mentation appears normal, affect normal/bright    Diagnostic Test Results:  Labs reviewed in Epic        Assessment & Plan     1. Varicose veins of right lower extremity with pain  Per patient's request, [facility] [place] [facility] Dr. Kim  Recommended compression stockings ,leg elevation while resting    - VASCULAR  SURGERY REFERRAL; Future       Chart documentation done in part with Dragon Voice recognition Software. Although reviewed after completion, some word and grammatical error may remain.    See Patient Instructions    No follow-ups on file.    Gwendolyn Cruz MD  [facility]

## 2019-07-30 LAB — DEPRECATED CALCIDIOL+CALCIFEROL SERPL-MC: 28 UG/L (ref 20–75)

## 2019-11-04 ENCOUNTER — HEALTH MAINTENANCE LETTER (OUTPATIENT)
Age: 39
End: 2019-11-04

## 2020-02-16 ENCOUNTER — HEALTH MAINTENANCE LETTER (OUTPATIENT)
Age: 40
End: 2020-02-16

## 2020-02-25 NOTE — PROGRESS NOTES
SUBJECTIVE:   CC: Kaia Smith is an 39 year old woman who presents for preventive health visit.     Healthy Habits:    Do you get at least three servings of calcium containing foods daily (dairy, green leafy vegetables, etc.)? yes    Amount of exercise or daily activities, outside of work: occasionally    Problems taking medications regularly No    Medication side effects: No    Have you had an eye exam in the past two years? yes    Do you see a dentist twice per year? yes    Do you have sleep apnea, excessive snoring or daytime drowsiness?no    Concerns: Patient lost her mother to uterine cancer 1 year ago, after loosing her father to cancer 2 years prior.  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    1) She complains of RLQ abdomen pain, worse when she hold that part of her abdomen. Denies weight changes, fever, chills, N/V/D, melena, hematochezia or constipation/change in BM. Previous imaging reviewed, has seen OBGYN     Right uterine varicose vein. In patients with chronic pelvic pain,  this can be a sign of pelvic congestion syndrome. In the appropriate  clinical situation, embolization is a treatment option.    2) She noticed a painful lesion on the anterior aspect of her neck 2 days ago, she denies fever, chills, upper respiratory infection symptoms, post nasal drip, dysphagia or odynphagia.No night sweats or weight changes    3)  She's also noticed a lump at the 3 o'clock region of her left breast, comes and goes, painless, denies nipple discharge. She has a history of breast augmentation with implants  -------------------------------------    Today's PHQ-2 Score:   PHQ-2 ( 1999 Pfizer) 2/26/2020 7/29/2019   Q1: Little interest or pleasure in doing things 0 0   Q2: Feeling down, depressed or hopeless 0 0   PHQ-2 Score 0 0       Abuse: Current or Past(Physical, Sexual or Emotional)- No  Do you feel safe in your environment? Yes        Social History     Tobacco Use      Smoking status: Never Smoker     Smokeless tobacco: Never Used   Substance Use Topics     Alcohol use: Yes     Alcohol/week: 0.0 standard drinks     Comment: occasionally     If you drink alcohol do you typically have >3 drinks per day or >7 drinks per week? No                     Reviewed orders with patient.  Reviewed health maintenance and updated orders accordingly - Yes  BP Readings from Last 3 Encounters:   20 112/62   19 100/67   19 122/74    Wt Readings from Last 3 Encounters:   20 59.8 kg (131 lb 14.4 oz)   19 58.9 kg (129 lb 14.4 oz)   19 58.6 kg (129 lb 1.6 oz)                  Patient Active Problem List   Diagnosis     CARDIOVASCULAR SCREENING; LDL GOAL LESS THAN 160     General counseling for prescription of oral contraceptives     H/O abnormal cervical Papanicolaou smear     History of dysplastic nevus     Past Surgical History:   Procedure Laterality Date     APPENDECTOMY      15 y/o      SECTION  3/19/2013    Procedure:  SECTION;;  Surgeon: Maddie Stallworth MD;  [facility]     COSMETIC MAMMOPLASTY AUGMENTATION BILATERAL Bilateral 2016    Silicone     CRYOCAUTERY OF CERVIX N/A age 18     HYSTEROSCOPY,DIAGNOSTIC  2008     REMV PILONIDAL LESION SIMPLE  10/20/2005       Social History     Tobacco Use     Smoking status: Never Smoker     Smokeless tobacco: Never Used   Substance Use Topics     Alcohol use: Yes     Alcohol/week: 0.0 standard drinks     Comment: occasionally     Family History   Problem Relation Age of Onset     Cancer Father         lymphoma and bladder     Hypertension Father      Alzheimer Disease Maternal Grandmother      Cerebrovascular Disease Maternal Grandmother      Alzheimer Disease Paternal Grandfather      Hypertension Mother      Cancer Mother         uterine     Diabetes No family hx of      Coronary Artery Disease No family hx of      Hyperlipidemia No family hx of      Breast Cancer No family hx of       Colon Cancer No family hx of      Prostate Cancer No family hx of      Anxiety Disorder No family hx of      Depression No family hx of      Thyroid Disease No family hx of      Genetic Disorder No family hx of          Current Outpatient Medications   Medication Sig Dispense Refill     vitamin D2 (ERGOCALCIFEROL) 01174 units (1250 mcg) capsule Take 1 capsule (50,000 Units) by mouth once a week for 12 doses 12 capsule 0     No Known Allergies  Recent Labs   Lab Test 20  1051 19  0904 16  0947 14  1714   LDL 92 87 88  --    HDL 80 77 70  --    TRIG 74 72 61  --    CR  --   --  0.62 0.60   GFRESTIMATED  --   --  >90  Non  GFR Calc   >90   GFRESTBLACK  --   --  >90   GFR Calc   >90   POTASSIUM  --   --  4.1  --    TSH 1.94 1.54  --   --         Mammogram not appropriate for this patient based on age.    Pertinent mammograms are reviewed under the imaging tab.  History of abnormal Pap smear: NO - age 30- 65 PAP every 3 years recommended  NO - age 30-65 PAP every 5 years with negative HPV co-testing recommended  PAP / HPV Latest Ref Rng & Units 2019   PAP - NIL NIL NIL   HPV 16 DNA NEG:Negative Negative - -   HPV 18 DNA NEG:Negative Negative - -   OTHER HR HPV NEG:Negative Negative - -     Reviewed and updated as needed this visit by clinical staff  Tobacco  Allergies  Meds  Problems  Med Hx  Surg Hx  Fam Hx  Soc Hx          Reviewed and updated as needed this visit by Provider  Problems        Past Medical History:   Diagnosis Date     Acute mastitis of left breast 2014     H/O abnormal cervical Papanicolaou smear 1999     Menarche     13 y/o     Traumatic injury during pregnancy 2013      Past Surgical History:   Procedure Laterality Date     APPENDECTOMY      13 y/o      SECTION  3/19/2013    Procedure:  SECTION;;  Surgeon: Maddie Stallworth MD;  [facility]     COSMETIC MAMMOPLASTY  "AUGMENTATION BILATERAL Bilateral 2016    Silicone     CRYOCAUTERY OF CERVIX N/A age 18     HYSTEROSCOPY,DIAGNOSTIC  2008     REMV PILONIDAL LESION SIMPLE  10/20/2005     OB History    Para Term  AB Living   4 3 3 0 1 3   SAB TAB Ectopic Multiple Live Births   1 0 0 0 3      # Outcome Date GA Lbr Robert/2nd Weight Sex Delivery Anes PTL Lv   4 Term 13 38w4d 06:48 / 04:06 3.572 kg (7 lb 14 oz) M CS-LTranv EPI N ILIA      Name: KESHIA PRESLEY JEROME      Apgar1: 8  Apgar5: 9   3 SAB 12           2 Term 11 39w0d 08:39 / 01:27 3.941 kg (8 lb 11 oz) M Vag-Spont EPI N ILIA      Birth Comments: In Vitro fert.      Name: Alireza      Apgar1: 9  Apgar5: 9   1 Term 08 38w5d 36:00 3.6 kg (7 lb 15 oz) M Vag-Spont EPI N ILIA      Birth Comments: IUI      Name: Josh       ROS:  CONSTITUTIONAL: NEGATIVE for fever, chills, change in weight  INTEGUMENTARU/SKIN: NEGATIVE for worrisome rashes, moles or lesions  EYES: NEGATIVE for vision changes or irritation  ENT: NEGATIVE for ear, mouth and throat problems  RESP: NEGATIVE for significant cough or SOB  BREAST: As  In HPI  CV: NEGATIVE for chest pain, palpitations or peripheral edema  GI: POSITIVE for abdominal pain RLQ, NEGATIVE for constipation, diarrhea, dyspepsia, dysphagia, gas or bloating, heartburn or reflux, hematemesis, hematochezia and hemorrhoids  : NEGATIVE for unusual urinary or vaginal symptoms. Periods are regular.  MUSCULOSKELETAL: NEGATIVE for significant arthralgias or myalgia  NEURO: NEGATIVE for weakness, dizziness or paresthesias  PSYCHIATRIC: NEGATIVE for changes in mood or affect    OBJECTIVE:   /62   Pulse 85   Temp 97  F (36.1  C) (Oral)   Ht 1.67 m (5' 5.75\")   Wt 59.8 kg (131 lb 14.4 oz)   LMP 2020   SpO2 100%   BMI 21.45 kg/m    EXAM:  GENERAL: healthy, alert and no distress  EYES: Eyes grossly normal to inspection, PERRL and conjunctivae and sclerae normal  HENT: ear canals and TM's normal, nose and mouth " without ulcers or lesions  NECK: no adenopathy, no asymmetry, masses, or scars and thyroid normal to palpation  RESP: lungs clear to auscultation - no rales, rhonchi or wheezes  BREAST: normal without masses, tenderness or nipple discharge and no palpable axillary masses or adenopathy  CV: regular rate and rhythm, normal S1 S2, no S3 or S4, no murmur, click or rub, no peripheral edema and peripheral pulses strong  ABDOMEN: soft, nontender, no hepatosplenomegaly, no masses and bowel sounds normal  MS: no gross musculoskeletal defects noted, no edema  SKIN: no suspicious lesions or rashes  NEURO: Normal strength and tone, mentation intact and speech normal  PSYCH: mentation appears normal, affect normal/bright      ASSESSMENT/PLAN:   1. Routine general medical examination at a health care facility  See counseling.    2. Lump or mass in breast  - US Breast Left; Future    3. Lipid screening  - Lipid panel reflex to direct LDL Fasting    4. Vitamin D deficiency  - Vitamin D Deficiency  - vitamin D2 (ERGOCALCIFEROL) 30622 units (1250 mcg) capsule; Take 1 capsule (50,000 Units) by mouth once a week for 12 doses  Dispense: 12 capsule; Refill: 0  - **Vitamin D Deficiency FUTURE 2mo; Future    5. CARDIOVASCULAR SCREENING; LDL GOAL LESS THAN 100  - TSH with free T4 reflex  - Lipid panel reflex to direct LDL Fasting  - Glucose    6. Chronic abdominal pain  - CT Abdomen Pelvis w Contrast; Future  - Creatinine; Future  - HCG Qual, Urine ([id number]); Future    7. Lesion of neck  Her exam was unremarkable for any lesions, she did have some benign left anterior cervical lymph nodes but not at the position of her pain which was in between the thyroid and cricoid cartilage. Reassurance given to patient. Advised to monitor her symptoms and let me know if persistent or worse, will proceed with an ultrasound soft tissue neck. I will also review her CBC and proceed with an ultrasound if  any abnormalities.    COUNSELING:   Reviewed  "preventive health counseling, as reflected in patient instructions       Regular exercise       Healthy diet/nutrition       Vision screening       Hearing screening       Contraception       Safe sex practices/STD prevention       Colon cancer screening       Consider Hep C screening for patients born between 1945 and 1965       HIV screeninx in teen years, 1x in adult years, and at intervals if high risk       (Cindi)menopause management    Estimated body mass index is 21.45 kg/m  as calculated from the following:    Height as of this encounter: 1.67 m (5' 5.75\").    Weight as of this encounter: 59.8 kg (131 lb 14.4 oz).         reports that she has never smoked. She has never used smokeless tobacco.      Counseling Resources:  ATP IV Guidelines  Pooled Cohorts Equation Calculator  Breast Cancer Risk Calculator  FRAX Risk Assessment  ICSI Preventive Guidelines  Dietary Guidelines for Americans, 2010  USDA's MyPlate  ASA Prophylaxis  Lung CA Screening    Avinash Chatman MD  [facility]  "

## 2020-02-26 ENCOUNTER — OFFICE VISIT (OUTPATIENT)
Dept: PEDIATRICS | Facility: CLINIC | Age: 40
End: 2020-02-26
Payer: COMMERCIAL

## 2020-02-26 ENCOUNTER — ANCILLARY PROCEDURE (OUTPATIENT)
Dept: MAMMOGRAPHY | Facility: CLINIC | Age: 40
End: 2020-02-26
Attending: FAMILY MEDICINE
Payer: COMMERCIAL

## 2020-02-26 ENCOUNTER — ANCILLARY PROCEDURE (OUTPATIENT)
Dept: ULTRASOUND IMAGING | Facility: CLINIC | Age: 40
End: 2020-02-26
Attending: FAMILY MEDICINE
Payer: COMMERCIAL

## 2020-02-26 VITALS
DIASTOLIC BLOOD PRESSURE: 62 MMHG | HEART RATE: 85 BPM | SYSTOLIC BLOOD PRESSURE: 112 MMHG | BODY MASS INDEX: 21.2 KG/M2 | WEIGHT: 131.9 LBS | HEIGHT: 66 IN | TEMPERATURE: 97 F | OXYGEN SATURATION: 100 %

## 2020-02-26 DIAGNOSIS — E55.9 VITAMIN D DEFICIENCY: ICD-10-CM

## 2020-02-26 DIAGNOSIS — L98.9 LESION OF NECK: ICD-10-CM

## 2020-02-26 DIAGNOSIS — N63.0 LUMP OR MASS IN BREAST: ICD-10-CM

## 2020-02-26 DIAGNOSIS — Z13.6 CARDIOVASCULAR SCREENING; LDL GOAL LESS THAN 100: ICD-10-CM

## 2020-02-26 DIAGNOSIS — Z13.220 LIPID SCREENING: ICD-10-CM

## 2020-02-26 DIAGNOSIS — R10.9 CHRONIC ABDOMINAL PAIN: ICD-10-CM

## 2020-02-26 DIAGNOSIS — G89.29 CHRONIC ABDOMINAL PAIN: ICD-10-CM

## 2020-02-26 DIAGNOSIS — Z00.00 ROUTINE GENERAL MEDICAL EXAMINATION AT A HEALTH CARE FACILITY: Primary | ICD-10-CM

## 2020-02-26 LAB
CHOLEST SERPL-MCNC: 187 MG/DL
GLUCOSE SERPL-MCNC: 82 MG/DL (ref 70–99)
HDLC SERPL-MCNC: 80 MG/DL
LDLC SERPL CALC-MCNC: 92 MG/DL
NONHDLC SERPL-MCNC: 107 MG/DL
TRIGL SERPL-MCNC: 74 MG/DL
TSH SERPL DL<=0.005 MIU/L-ACNC: 1.94 MU/L (ref 0.4–4)

## 2020-02-26 PROCEDURE — 82306 VITAMIN D 25 HYDROXY: CPT | Performed by: FAMILY MEDICINE

## 2020-02-26 PROCEDURE — 99395 PREV VISIT EST AGE 18-39: CPT | Performed by: FAMILY MEDICINE

## 2020-02-26 PROCEDURE — 36415 COLL VENOUS BLD VENIPUNCTURE: CPT | Performed by: FAMILY MEDICINE

## 2020-02-26 PROCEDURE — 77066 DX MAMMO INCL CAD BI: CPT

## 2020-02-26 PROCEDURE — 82947 ASSAY GLUCOSE BLOOD QUANT: CPT | Performed by: FAMILY MEDICINE

## 2020-02-26 PROCEDURE — G0279 TOMOSYNTHESIS, MAMMO: HCPCS

## 2020-02-26 PROCEDURE — 84443 ASSAY THYROID STIM HORMONE: CPT | Performed by: FAMILY MEDICINE

## 2020-02-26 PROCEDURE — 80061 LIPID PANEL: CPT | Performed by: FAMILY MEDICINE

## 2020-02-26 PROCEDURE — 76642 ULTRASOUND BREAST LIMITED: CPT | Mod: LT

## 2020-02-26 PROCEDURE — 99213 OFFICE O/P EST LOW 20 MIN: CPT | Mod: 25 | Performed by: FAMILY MEDICINE

## 2020-02-26 ASSESSMENT — PAIN SCALES - GENERAL: PAINLEVEL: NO PAIN (0)

## 2020-02-26 ASSESSMENT — MIFFLIN-ST. JEOR: SCORE: 1286.07

## 2020-02-27 LAB — DEPRECATED CALCIDIOL+CALCIFEROL SERPL-MC: 17 UG/L (ref 20–75)

## 2020-02-28 RX ORDER — ERGOCALCIFEROL 1.25 MG/1
50000 CAPSULE, LIQUID FILLED ORAL WEEKLY
Qty: 12 CAPSULE | Refills: 0 | Status: SHIPPED | OUTPATIENT
Start: 2020-02-28 | End: 2020-05-16

## 2020-03-03 ENCOUNTER — ANCILLARY PROCEDURE (OUTPATIENT)
Dept: CT IMAGING | Facility: CLINIC | Age: 40
End: 2020-03-03
Attending: FAMILY MEDICINE
Payer: COMMERCIAL

## 2020-03-03 DIAGNOSIS — G89.29 CHRONIC ABDOMINAL PAIN: ICD-10-CM

## 2020-03-03 DIAGNOSIS — R10.9 CHRONIC ABDOMINAL PAIN: ICD-10-CM

## 2020-03-03 DIAGNOSIS — N83.201 CYST OF RIGHT OVARY: ICD-10-CM

## 2020-03-03 DIAGNOSIS — N94.89 PELVIC CONGESTION SYNDROME: Primary | ICD-10-CM

## 2020-03-03 PROCEDURE — 74177 CT ABD & PELVIS W/CONTRAST: CPT | Performed by: RADIOLOGY

## 2020-03-03 RX ORDER — IOPAMIDOL 755 MG/ML
98 INJECTION, SOLUTION INTRAVASCULAR ONCE
Status: COMPLETED | OUTPATIENT
Start: 2020-03-03 | End: 2020-03-03

## 2020-03-03 RX ADMIN — IOPAMIDOL 98 ML: 755 INJECTION, SOLUTION INTRAVASCULAR at 08:05

## 2020-11-10 ENCOUNTER — TELEPHONE (OUTPATIENT)
Dept: TRANSPLANT | Facility: CLINIC | Age: 40
End: 2020-11-10

## 2020-11-10 NOTE — TELEPHONE ENCOUNTER
"[facility] BREEZE  [id number]      LIVING KIDNEY DONOR EVALUATION  Donor First Name Kaia Donor KELLY    Donor Last Name Sarah Completed 2020 10:58 AM    1980 Record ID [id number]   BREEZE Screen PASSED     Intended Recipient  Recipient First Name Jeff Recipient MRN    Recipient Last Name Sarah Relationship Spouse   Recipient  1979-02-15 Recipient Diagnosis    Recipient's ABO      Donor Information  Age 40 Gender Female   Ht 168 cm (5' 6'') Race    Wt 58.9 kg (130 lbs) Ethnicity Not /   BMI 21.00 kg/m  Preferred Language English      Required No     Blood Type A   Demographics  Home Address [address] [place] [address] # [phone]   [facility] #    [facility] Code [address] Type    Country [place] Preferred Contact day Mon, Fri, Thur, Wed, Tue   Email [email or web address] Preferred Contact time 11:00 AM-1:00 PM, 1:00 PM-4:00 PM, 09:00 AM-11:00 AM   &&   Donor's Medical Information  Medical History None Reported Medications None Reported   Surgical History Appendectomy      Breast Augmentation   Vein Stripping Allergies NKDA   Social History EtOH: Occasional (1-2 drinks/week)   Illicit Drug Use: Denies   Tobacco: Denies Self-Reported Functional Status \"I am able to participate in strenuous sports such as swimming, singles tennis, football, basketball, or skiing\"   Family Medical History Cancer (Father, Mother)   Diabetes (denies)   Heart Disease (denies)   Hypertension (Mother, Father)   Kidney Disease (denies)   Kidney Stones (denies) Exercise Frequency Exercise (1 X per week)   Review of Organ Systems  Review of Systems Airway or Lungs: No   Blood Disorder: No   Cancer: No   Diabetes,Thyroid,Adrenal,Endocrine Disorder: No   Digestive or Liver: No   Female Health: No   Heart or Circulatory System: No   Immune Diseases: No   Kidneys and Bladder: No   Muscles,Bones,Joints: No   Neuro: No   Psych: No   &&   Donor's Social " Information  Marital Status  Living Accommodation Owns own home/apartment   Level of Education College or baccalaureate degree complete Living Arrangement With spouse   Employment Status Part Time Concerns: health and life insurance No   Employer [employer] Concerns: job security and lost income No   Occupation      Medical Insurance Status Has medical insurance     High Risk Behavior  High Risk Behaviors Blood transfusion < 12 months. (NO)   Commercial sex < 12 months. (NO)   Illicit IV drug use < 5yrs. (NO)   Other high risk sexual contact < 12 months. (NO)   Reason for Donation  Referral Tx Candidate Reason for Donation My  is in kidney failure and I am willing to donate.   Permission to Disclose Inquiry Yes Patient Comments    Donor Motivation Level Ready to start evaluation with reservations     PCP Contact  PCP Name Gwendolyn WELCH [facility] Phone [phone]   Emergency Contact  First Name Jeff First Name Anny   Last Name Zeamanda Last Name Zehnder   Phone # [phone] Phone # [phone]   Phone Type Mobile Phone Type Mobile   Relationship Spouse Relationship Friend or Other   Office Use  Reviewed By    Reviewed 11/10/2020 3:55 PM   Admin Folder Archive   Comments    Lost for Followup    Extended Comments    BREEZE ID [id number] survey status completed   Activity History  Call  Due Date 11/10/2020   Last Modified Date/Time 11/10/2020 11:40 AM   Comments

## 2020-11-11 DIAGNOSIS — Z00.5 TRANSPLANT DONOR EVALUATION: Primary | ICD-10-CM

## 2020-11-20 ENCOUNTER — OFFICE VISIT (OUTPATIENT)
Dept: DERMATOLOGY | Facility: CLINIC | Age: 40
End: 2020-11-20
Payer: COMMERCIAL

## 2020-11-20 DIAGNOSIS — Z86.018 HISTORY OF DYSPLASTIC NEVUS: Primary | ICD-10-CM

## 2020-11-20 PROCEDURE — 99213 OFFICE O/P EST LOW 20 MIN: CPT | Performed by: DERMATOLOGY

## 2020-11-20 ASSESSMENT — PAIN SCALES - GENERAL: PAINLEVEL: NO PAIN (0)

## 2020-11-20 NOTE — PROGRESS NOTES
[facility] Dermatology Note      Dermatology Problem List:  1. Compound nevus with mild dysplasia, right lower back with recurrence peripherally.  -s/p biopsy 2016, s/p shave bx 19     Last TBSE: 2020    Encounter Date: 2020    CC:  Chief Complaint   Patient presents with     Derm Problem     skin check hx of DN          History of Present Illness:  Ms. Kaia Smith is a 40 year old female with hx of DN who presents as a follow-up for skin check. The patient was last seen in clinic when 2019.   Patient reported no concerns. Nothing bleeding, crusting, or changing. No changes in medical problems. Patient reported feeling generally well.       Past Medical History:   Patient Active Problem List   Diagnosis     CARDIOVASCULAR SCREENING; LDL GOAL LESS THAN 160     General counseling for prescription of oral contraceptives     H/O abnormal cervical Papanicolaou smear     History of dysplastic nevus     Past Medical History:   Diagnosis Date     Acute mastitis of left breast 2014     H/O abnormal cervical Papanicolaou smear 1999     Menarche     13 y/o     Traumatic injury during pregnancy 2013     Past Surgical History:   Procedure Laterality Date     APPENDECTOMY      15 y/o      SECTION  3/19/2013    Procedure:  SECTION;;  Surgeon: Maddie Stallworth MD;  [facility]     COSMETIC MAMMOPLASTY AUGMENTATION BILATERAL Bilateral 2016    Silicone     CRYOCAUTERY OF CERVIX N/A age 18     HYSTEROSCOPY,DIAGNOSTIC  2008     REMV PILONIDAL LESION SIMPLE  10/20/2005       Social History:  Patient reports that she has never smoked. She has never used smokeless tobacco. She reports current alcohol use. She reports that she does not use drugs.    Family History:  Family History   Problem Relation Age of Onset     Cancer Father         lymphoma and bladder     Hypertension Father      Alzheimer Disease Maternal Grandmother       Cerebrovascular Disease Maternal Grandmother      Alzheimer Disease Paternal Grandfather      Hypertension Mother      Cancer Mother         uterine     Diabetes No family hx of      Coronary Artery Disease No family hx of      Hyperlipidemia No family hx of      Breast Cancer No family hx of      Colon Cancer No family hx of      Prostate Cancer No family hx of      Anxiety Disorder No family hx of      Depression No family hx of      Thyroid Disease No family hx of      Genetic Disorder No family hx of        Medications:  No current outpatient medications on file.       No Known Allergies    Review of Systems:  -Constitutional: Otherwise feeling well today, in usual state of health.      Physical exam:  Vitals: There were no vitals taken for this visit.  GEN: This is a well developed, well-nourished female in no acute distress, in a pleasant mood.    SKIN: Total skin excluding the undergarment areas was performed. The exam included the head/face, neck, both arms, chest, back, abdomen, both legs, digits and/or nails. Declines genital exam  -Thompson skin type: II  -no repigmentation at site of dyplastic nevus  -5mm pigmented macule, regular symmetric homogenous but shape is linear  -No other lesions of concern on areas examined.     Labs:  NA    Impression/Plan:  1. Mild dysplastic nevus of the right lower back, s/p punch biopsy 9/27/2016 and punch excsion  ?    2. Pigmented macule, left upper arm, consistent with nevus, reviewed with patient this has an atypical shape but otherwise appears normal, reviewed possibility of skin cancer. Offered biopsy (with scar and diagnosis) versus monitoring( with risk of delayed skin cancer diagnosis). She elects for monitoring.   Photo obtained  CC No referring provider defined for this encounter. on close of this encounter.  Follow-up in 3 months in person      Staff Involved:  Scribe/Staff      Scribe Disclosure:   Lula MARINELLI, am serving as a scribe to document services  personally performed by Dr. Ellen Okeefe, based on data collection and the provider's statements to me.   LXIONG3, MEDICAL ASSISTANT     Provider Disclosure:   The documentation recorded by the scribe accurately reflects the services I personally performed and the decisions made by me.    Ellen Okeefe MD    Department of Dermatology  [facility]: Phone: [phone], Fax:[phone]  [facility]: Phone: [phone], Fax: [phone]

## 2020-11-20 NOTE — NURSING NOTE
Kaia Smith's goals for this visit include:   Chief Complaint   Patient presents with     Derm Problem     skin check hx of DN        She requests these members of her care team be copied on today's visit information: Yes     PCP: [facility]    Referring Provider:  No referring provider defined for this encounter.    There were no vitals taken for this visit.    Do you need any medication refills at today's visit? No   LXIONG3, MEDICAL ASSISTANT

## 2020-11-20 NOTE — LETTER
2020         RE: Kaia Smith  [address]  [facility] [address]        Dear Colleague,    Thank you for referring your patient, Kaia Smith, to the [facility]. Please see a copy of my visit note below.    [facility] Dermatology Note      Dermatology Problem List:  1. Compound nevus with mild dysplasia, right lower back with recurrence peripherally.  -s/p biopsy 2016, s/p shave bx 19     Last TBSE: 2020    Encounter Date: 2020    CC:  Chief Complaint   Patient presents with     Derm Problem     skin check hx of DN          History of Present Illness:  Ms. Kaia Smith is a 40 year old female with hx of DN who presents as a follow-up for skin check. The patient was last seen in clinic when 2019.   Patient reported no concerns. Nothing bleeding, crusting, or changing. No changes in medical problems. Patient reported feeling generally well.       Past Medical History:   Patient Active Problem List   Diagnosis     CARDIOVASCULAR SCREENING; LDL GOAL LESS THAN 160     General counseling for prescription of oral contraceptives     H/O abnormal cervical Papanicolaou smear     History of dysplastic nevus     Past Medical History:   Diagnosis Date     Acute mastitis of left breast 2014     H/O abnormal cervical Papanicolaou smear 1999     Menarche     11 y/o     Traumatic injury during pregnancy 2013     Past Surgical History:   Procedure Laterality Date     APPENDECTOMY      15 y/o      SECTION  3/19/2013    Procedure:  SECTION;;  Surgeon: Maddie Stallworth MD;  [facility]     COSMETIC MAMMOPLASTY AUGMENTATION BILATERAL Bilateral 2016    Silicone     CRYOCAUTERY OF CERVIX N/A age 18     HYSTEROSCOPY,DIAGNOSTIC  2008     REMV PILONIDAL LESION SIMPLE  10/20/2005       Social History:  Patient reports that she has never smoked. She has never used smokeless tobacco. She reports  current alcohol use. She reports that she does not use drugs.    Family History:  Family History   Problem Relation Age of Onset     Cancer Father         lymphoma and bladder     Hypertension Father      Alzheimer Disease Maternal Grandmother      Cerebrovascular Disease Maternal Grandmother      Alzheimer Disease Paternal Grandfather      Hypertension Mother      Cancer Mother         uterine     Diabetes No family hx of      Coronary Artery Disease No family hx of      Hyperlipidemia No family hx of      Breast Cancer No family hx of      Colon Cancer No family hx of      Prostate Cancer No family hx of      Anxiety Disorder No family hx of      Depression No family hx of      Thyroid Disease No family hx of      Genetic Disorder No family hx of        Medications:  No current outpatient medications on file.       No Known Allergies    Review of Systems:  -Constitutional: Otherwise feeling well today, in usual state of health.      Physical exam:  Vitals: There were no vitals taken for this visit.  GEN: This is a well developed, well-nourished female in no acute distress, in a pleasant mood.    SKIN: Total skin excluding the undergarment areas was performed. The exam included the head/face, neck, both arms, chest, back, abdomen, both legs, digits and/or nails. Declines genital exam  -Thompson skin type: II  -no repigmentation at site of dyplastic nevus  -5mm pigmented macule, regular symmetric homogenous but shape is linear  -No other lesions of concern on areas examined.     Labs:  NA    Impression/Plan:  1. Mild dysplastic nevus of the right lower back, s/p punch biopsy 9/27/2016 and punch excsion  ?    2. Pigmented macule, left upper arm, consistent with nevus, reviewed with patient this has an atypical shape but otherwise appears normal, reviewed possibility of skin cancer. Offered biopsy (with scar and diagnosis) versus monitoring( with risk of delayed skin cancer diagnosis). She elects for monitoring.    Photo obtained  CC No referring provider defined for this encounter. on close of this encounter.  Follow-up in 3 months in person      Staff Involved:  Scribe/Staff      Scribe Disclosure:   I, Cotton, am serving as a scribe to document services personally performed by Dr. Ellen Okeefe, based on data collection and the provider's statements to me.   LXIONG3, MEDICAL ASSISTANT     Provider Disclosure:   The documentation recorded by the scribe accurately reflects the services I personally performed and the decisions made by me.    Ellen Okeefe MD    Department of Dermatology  [facility]: Phone: [phone], Fax:[phone]  [facility]: Phone: [phone], Fax: [phone]          Again, thank you for allowing me to participate in the care of your patient.        Sincerely,        Ellen Okeefe MD

## 2020-11-20 NOTE — PATIENT INSTRUCTIONS
[facility] Dermatology Visit    Thank you for allowing us to participate in your care. Your findings, instructions and follow-up plan are as follows:    When should I call my doctor?    If you are worsening or not improving, please, contact us or seek urgent care as noted below.     Who should I call with questions (adults)?    [facility] (adult and pediatric): [phone]     [facility] (adult): [phone]    For urgent needs outside of business hours call the [facility] at [phone] and ask for the dermatology resident on call    If this is a medical emergency and you are unable to reach an ER, Call 911      Who should I call with questions (pediatric)?  [facility]- Pediatric Dermatology  Dr. Salima Dubose, Dr. Maida Alva, Dr. Chrystal Jolly, Ashlee Gibbs, PA  Dr. Lulu Montgomery, Dr. Stacey Gomez & Dr. Keron Whiting  Non Urgent  Nurse Triage Line; [phone]- Tea and Yazmin RN Care Coordinators   Meghan (/Complex ) [phone]    If you need a prescription refill, please contact your pharmacy. Refills are approved or denied by our Physicians during normal business hours, Monday through Fridays  Per office policy, refills will not be granted if you have not been seen within the past year (or sooner depending on your child's condition)    Scheduling Information:  Pediatric Appointment Scheduling and Call Center [phone]  Radiology Scheduling- [phone]  Sedation Unit Scheduling- [phone]  [place] Scheduling- General [phone]; Pediatric Dermatology [phone]  Main  Services: [phone]  Austrian: [phone]  Hong Konger: [phone]  Hmong/Kiswahili/Serbian: [phone]  Preadmission Nursing Department Fax Number: [phone] (Fax all pre-operative paperwork to this number)    For urgent matters arising during evenings,  weekends, or holidays that cannot wait for normal business hours please call [phone] and ask for the Dermatology Resident On-Call to be paged.

## 2020-11-22 ENCOUNTER — HEALTH MAINTENANCE LETTER (OUTPATIENT)
Age: 40
End: 2020-11-22

## 2020-12-17 ENCOUNTER — ALLIED HEALTH/NURSE VISIT (OUTPATIENT)
Dept: PEDIATRICS | Facility: CLINIC | Age: 40
End: 2020-12-17
Payer: COMMERCIAL

## 2020-12-17 VITALS
DIASTOLIC BLOOD PRESSURE: 72 MMHG | HEART RATE: 74 BPM | SYSTOLIC BLOOD PRESSURE: 123 MMHG | HEIGHT: 66 IN | WEIGHT: 130.6 LBS | BODY MASS INDEX: 20.99 KG/M2

## 2020-12-17 DIAGNOSIS — E55.9 VITAMIN D DEFICIENCY: ICD-10-CM

## 2020-12-17 DIAGNOSIS — Z00.5 TRANSPLANT DONOR EVALUATION: Primary | ICD-10-CM

## 2020-12-17 DIAGNOSIS — Z00.5 TRANSPLANT DONOR EVALUATION: ICD-10-CM

## 2020-12-17 DIAGNOSIS — R10.9 CHRONIC ABDOMINAL PAIN: ICD-10-CM

## 2020-12-17 DIAGNOSIS — G89.29 CHRONIC ABDOMINAL PAIN: ICD-10-CM

## 2020-12-17 LAB
ABO + RH BLD: NORMAL
ABO + RH BLD: NORMAL
ALBUMIN UR-MCNC: 30 MG/DL
APPEARANCE UR: ABNORMAL
BACTERIA #/AREA URNS HPF: ABNORMAL /HPF
BILIRUB UR QL STRIP: NEGATIVE
COLOR UR AUTO: YELLOW
CREAT SERPL-MCNC: 0.72 MG/DL (ref 0.52–1.04)
CREAT UR-MCNC: 495 MG/DL
GFR SERPL CREATININE-BSD FRML MDRD: >90 ML/MIN/{1.73_M2}
GLUCOSE SERPL-MCNC: 85 MG/DL (ref 70–99)
GLUCOSE UR STRIP-MCNC: NEGATIVE MG/DL
HCG UR QL: NEGATIVE
HGB BLD-MCNC: 12.5 G/DL (ref 11.7–15.7)
HGB UR QL STRIP: ABNORMAL
KETONES UR STRIP-MCNC: NEGATIVE MG/DL
LEUKOCYTE ESTERASE UR QL STRIP: NEGATIVE
MICROALBUMIN UR-MCNC: 45 MG/L
MICROALBUMIN/CREAT UR: 9.11 MG/G CR (ref 0–25)
MUCOUS THREADS #/AREA URNS LPF: PRESENT /LPF
NITRATE UR QL: NEGATIVE
NON-SQ EPI CELLS #/AREA URNS LPF: ABNORMAL /LPF
PH UR STRIP: 5.5 PH (ref 5–7)
PROT UR-MCNC: 0.14 G/L
PROT/CREAT 24H UR: 0.03 G/G CR (ref 0–0.2)
RBC #/AREA URNS AUTO: ABNORMAL /HPF
SOURCE: ABNORMAL
SP GR UR STRIP: 1.03 (ref 1–1.03)
SPECIMEN EXP DATE BLD: NORMAL
UROBILINOGEN UR STRIP-MCNC: NORMAL MG/DL (ref 0–2)
WBC #/AREA URNS AUTO: ABNORMAL /HPF

## 2020-12-17 PROCEDURE — 99207 PR NO CHARGE NURSE ONLY: CPT

## 2020-12-17 PROCEDURE — 36415 COLL VENOUS BLD VENIPUNCTURE: CPT | Performed by: FAMILY MEDICINE

## 2020-12-17 PROCEDURE — 82565 ASSAY OF CREATININE: CPT | Performed by: FAMILY MEDICINE

## 2020-12-17 ASSESSMENT — MIFFLIN-ST. JEOR: SCORE: 1275.18

## 2020-12-17 NOTE — NURSING NOTE
"Chief Complaint   Patient presents with     Other     living donor exam       Initial /72 (BP Location: Right arm, Patient Position: Sitting, Cuff Size: Adult Regular)   Pulse 74   Ht 1.67 m (5' 5.75\")   Wt 59.2 kg (130 lb 9.6 oz)   BMI 21.24 kg/m   Estimated body mass index is 21.24 kg/m  as calculated from the following:    Height as of this encounter: 1.67 m (5' 5.75\").    Weight as of this encounter: 59.2 kg (130 lb 9.6 oz).  Medication Reconciliation: unable or not appropriate to perform      ANALI Rodriguez      "

## 2020-12-17 NOTE — PROGRESS NOTES
I recorded a height, weight and three blood pressures 15 minutes apart.  I validated with the patient they have already had their lab appointment, have one today or one in the future.     ALMA ROSA RodriguezA

## 2020-12-18 DIAGNOSIS — Z00.5 TRANSPLANT DONOR EVALUATION: Primary | ICD-10-CM

## 2020-12-22 LAB — DEPRECATED CALCIDIOL+CALCIFEROL SERPL-MC: 21 UG/L (ref 20–75)

## 2021-02-12 ENCOUNTER — TELEPHONE (OUTPATIENT)
Dept: TRANSPLANT | Facility: CLINIC | Age: 41
End: 2021-02-12

## 2021-02-12 NOTE — TELEPHONE ENCOUNTER
Patient tried reaching out to her coordinator has not gotten any responds and is getting frustrated with the program. Please contact patient.

## 2021-02-23 ENCOUNTER — OFFICE VISIT (OUTPATIENT)
Dept: DERMATOLOGY | Facility: CLINIC | Age: 41
End: 2021-02-23
Payer: COMMERCIAL

## 2021-02-23 DIAGNOSIS — D48.5 NEOPLASM OF UNCERTAIN BEHAVIOR OF SKIN: Primary | ICD-10-CM

## 2021-02-23 PROCEDURE — 11102 TANGNTL BX SKIN SINGLE LES: CPT | Performed by: DERMATOLOGY

## 2021-02-23 PROCEDURE — 11103 TANGNTL BX SKIN EA SEP/ADDL: CPT | Performed by: DERMATOLOGY

## 2021-02-23 PROCEDURE — 88305 TISSUE EXAM BY PATHOLOGIST: CPT | Performed by: DERMATOLOGY

## 2021-02-23 NOTE — PATIENT INSTRUCTIONS

## 2021-02-23 NOTE — NURSING NOTE
Drug [employer] Record    Drug Name: Lidocaine with Epi  Dose: see MD note   Route administered: SQ  NDC #: 3213-9403-87      LOT #: -[id number]  SITE: see MD note   : [employer]  EXPIRATION DATE: 6/1/2021    CEE, MEDICAL ASSISTANT

## 2021-02-23 NOTE — NURSING NOTE
Kaia Smith's goals for this visit include:   Chief Complaint   Patient presents with     RECHECK     recheck two lesions one on the left upper arm and right side of neck        She requests these members of her care team be copied on today's visit information:     PCP: No Ref-Primary, Physician    Referring Provider:  No referring provider defined for this encounter.    There were no vitals taken for this visit.    Do you need any medication refills at today's visit? N/a..Tiny Leos RN

## 2021-02-23 NOTE — LETTER
2/23/2021         RE: Kaia Smith  [address]  [place] [address]        Dear Colleague,    Thank you for referring your patient, Kaia Smith, to the [facility]. Please see a copy of my visit note below.    [facility] Dermatology Note  Encounter Date: Feb 23, 2021  Office Visit     Dermatology Problem List:  1. Compound nevus with mild dysplasia, right lower back with recurrence peripherally.  -s/p biopsy 9/27/2016, s/p shave bx 5/7/19  ____________________________________________    Assessment & Plan:  # Hx of dysplastic nevus  -repeat skin exam in 1 year    #2Left upper arm with pigmented lesion 5mm x 1mm dysplastic nevus or other, pt wants to convert to biopsy  Shave biopsy: Location(s) left upper arm.  After discussion of benefits and risks including but not limited to bleeding/bruising, pain/swelling, infection, scar, incomplete removal, nerve damage/numbness, recurrence, and non-diagnostic biopsy, written consent, verbal consent and photographs were obtained. Time-out was performed. The area was cleaned with isopropyl alcohol. 0.5mL of 1% lidocaine with epinephrine was injected to obtain adequate anesthesia of each lesion. Shave biopsy was performed. Hemostasis was achieved with aluminium chloride. Vaseline and a sterile dressing were applied. The patient tolerated the procedure and no complications were noted. The patient was provided with verbal and written post care instructions.     #3 NUB, right posterior neck- sebaceous hyperplasia or bc  -Shave biopsy: Location(s) right posterior neck.  After discussion of benefits and risks including but not limited to bleeding/bruising, pain/swelling, infection, scar, incomplete removal, nerve damage/numbness, recurrence, and non-diagnostic biopsy, written consent, verbal consent and photographs were obtained. Time-out was performed. The area was cleaned with isopropyl alcohol. 0.5mL of 1% lidocaine  with epinephrine was injected to obtain adequate anesthesia of each lesion. Shave biopsy was performed. Hemostasis was achieved with aluminium chloride. Vaseline and a sterile dressing were applied. The patient tolerated the procedure and no complications were noted. The patient was provided with verbal and written post care instructions.     Procedures Performed:   NA    Follow-up: 1 year(s) in-person, or earlier for new or changing lesions    Staff and Scribe:     Scribe Disclosure:   I, Kirit Hussein, am serving as a scribe to document services personally performed by this physician, Dr. Ellen Okeefe, based on data collection and the provider's statements to me.     Provider Disclosure:   The documentation recorded by the scribe accurately reflects the services I personally performed and the decisions made by me.    Ellen Okeefe MD    Department of Dermatology  [facility]: Phone: [phone], Fax:[phone]  [facility]: Phone: [phone], Fax: [phone]      ____________________________________________    CC: RECHECK (recheck two lesions one on the left upper arm and right side of neck )    HPI:  Ms. Kaia Smith is a(n) 40 year old female who presents today as a return patient for a spot check.    Last seen 11/20/20 for a total body skin check. At that time, a pigmented macule was noted on the left upper arm with an atypical shape. Patient elected to monitor and was instructed to follow up in 3 months to ensure this lesion had resolved.    Today, the patient reports mole on neck, not resolved since last viist. Wants checked.     Patient is otherwise feeling well, without additional concerns.    Labs:   NA    Physical Exam:  Vitals: There were no vitals taken for this visit.  SKIN: Focused examination of neck and the left uppr arm was performed.  -  Left upper arm with  pigmented lesion 5mm x 1mm   - right posterior neck red yellow papule, 3-4mm   - No other lesions of concern on areas examined.     Medications:  No current outpatient medications on file.     No current facility-administered medications for this visit.       Past Medical History:   Patient Active Problem List   Diagnosis     CARDIOVASCULAR SCREENING; LDL GOAL LESS THAN 160     General counseling for prescription of oral contraceptives     H/O abnormal cervical Papanicolaou smear     History of dysplastic nevus     Past Medical History:   Diagnosis Date     Acute mastitis of left breast 2/7/2014     H/O abnormal cervical Papanicolaou smear 01/01/1999     Menarche     11 y/o     Traumatic injury during pregnancy 2/12/2013        CC No referring provider defined for this encounter. on close of this encounter.      Again, thank you for allowing me to participate in the care of your patient.        Sincerely,        Ellen Okeefe MD

## 2021-02-23 NOTE — PROGRESS NOTES
[facility] Dermatology Note  Encounter Date: Feb 23, 2021  Office Visit     Dermatology Problem List:  1. Compound nevus with mild dysplasia, right lower back with recurrence peripherally.  -s/p biopsy 9/27/2016, s/p shave bx 5/7/19  ____________________________________________    Assessment & Plan:  # Hx of dysplastic nevus  -repeat skin exam in 1 year    #2Left upper arm with pigmented lesion 5mm x 1mm dysplastic nevus or other, pt wants to convert to biopsy  Shave biopsy: Location(s) left upper arm.  After discussion of benefits and risks including but not limited to bleeding/bruising, pain/swelling, infection, scar, incomplete removal, nerve damage/numbness, recurrence, and non-diagnostic biopsy, written consent, verbal consent and photographs were obtained. Time-out was performed. The area was cleaned with isopropyl alcohol. 0.5mL of 1% lidocaine with epinephrine was injected to obtain adequate anesthesia of each lesion. Shave biopsy was performed. Hemostasis was achieved with aluminium chloride. Vaseline and a sterile dressing were applied. The patient tolerated the procedure and no complications were noted. The patient was provided with verbal and written post care instructions.     #3 NUB, right posterior neck- sebaceous hyperplasia or bc  -Shave biopsy: Location(s) right posterior neck.  After discussion of benefits and risks including but not limited to bleeding/bruising, pain/swelling, infection, scar, incomplete removal, nerve damage/numbness, recurrence, and non-diagnostic biopsy, written consent, verbal consent and photographs were obtained. Time-out was performed. The area was cleaned with isopropyl alcohol. 0.5mL of 1% lidocaine with epinephrine was injected to obtain adequate anesthesia of each lesion. Shave biopsy was performed. Hemostasis was achieved with aluminium chloride. Vaseline and a sterile dressing were applied. The patient tolerated the procedure and no complications  were noted. The patient was provided with verbal and written post care instructions.     Procedures Performed:   NA    Follow-up: 1 year(s) in-person, or earlier for new or changing lesions    Staff and Scribe:     Scribe Disclosure:   I, Kirit Hussein, am serving as a scribe to document services personally performed by this physician, Dr. Ellen Okeefe, based on data collection and the provider's statements to me.     Provider Disclosure:   The documentation recorded by the scribe accurately reflects the services I personally performed and the decisions made by me.    Ellen Okeefe MD    Department of Dermatology  [facility]: Phone: [phone], Fax:[phone]  [facility]: Phone: [phone], Fax: [phone]      ____________________________________________    CC: RECHECK (recheck two lesions one on the left upper arm and right side of neck )    HPI:  Ms. Kaia Smith is a(n) 40 year old female who presents today as a return patient for a spot check.    Last seen 11/20/20 for a total body skin check. At that time, a pigmented macule was noted on the left upper arm with an atypical shape. Patient elected to monitor and was instructed to follow up in 3 months to ensure this lesion had resolved.    Today, the patient reports mole on neck, not resolved since last viist. Wants checked.     Patient is otherwise feeling well, without additional concerns.    Labs:   NA    Physical Exam:  Vitals: There were no vitals taken for this visit.  SKIN: Focused examination of neck and the left uppr arm was performed.  -  Left upper arm with pigmented lesion 5mm x 1mm   - right posterior neck red yellow papule, 3-4mm   - No other lesions of concern on areas examined.     Medications:  No current outpatient medications on file.     No current facility-administered medications for this visit.        Past Medical History:   Patient Active Problem List   Diagnosis     CARDIOVASCULAR SCREENING; LDL GOAL LESS THAN 160     General counseling for prescription of oral contraceptives     H/O abnormal cervical Papanicolaou smear     History of dysplastic nevus     Past Medical History:   Diagnosis Date     Acute mastitis of left breast 2/7/2014     H/O abnormal cervical Papanicolaou smear 01/01/1999     Menarche     13 y/o     Traumatic injury during pregnancy 2/12/2013        CC No referring provider defined for this encounter. on close of this encounter.

## 2021-02-27 LAB — COPATH REPORT: NORMAL

## 2021-03-23 ENCOUNTER — ANCILLARY PROCEDURE (OUTPATIENT)
Dept: MAMMOGRAPHY | Facility: CLINIC | Age: 41
End: 2021-03-23
Attending: FAMILY MEDICINE
Payer: COMMERCIAL

## 2021-03-23 DIAGNOSIS — Z12.31 VISIT FOR SCREENING MAMMOGRAM: ICD-10-CM

## 2021-03-23 PROCEDURE — 77063 BREAST TOMOSYNTHESIS BI: CPT | Mod: GC | Performed by: STUDENT IN AN ORGANIZED HEALTH CARE EDUCATION/TRAINING PROGRAM

## 2021-03-23 PROCEDURE — 77067 SCR MAMMO BI INCL CAD: CPT | Mod: GC | Performed by: STUDENT IN AN ORGANIZED HEALTH CARE EDUCATION/TRAINING PROGRAM

## 2021-04-04 ENCOUNTER — HEALTH MAINTENANCE LETTER (OUTPATIENT)
Age: 41
End: 2021-04-04

## 2021-08-31 NOTE — PATIENT INSTRUCTIONS
[phone] ultrasound        Preventive Health Recommendations  Female Ages 40 to 49    Yearly exam:     See your health care provider every year in order to  1. Review health changes.   2. Discuss preventive care.    3. Review your medicines if your doctor prescribed any.      Get a Pap test every three years (unless you have an abnormal result and your provider advises testing more often).      If you get Pap tests with HPV test, you only need to test every 5 years, unless you have an abnormal result. You do not need a Pap test if your uterus was removed (hysterectomy) and you have not had cancer.      You should be tested each year for STDs (sexually transmitted diseases), if you're at risk.     Ask your doctor if you should have a mammogram.      Have a colonoscopy (test for colon cancer) if someone in your family has had colon cancer or polyps before age 50.       Have a cholesterol test every 5 years.       Have a diabetes test (fasting glucose) after age 45. If you are at risk for diabetes, you should have this test every 3 years.    Shots: Get a flu shot each year. Get a tetanus shot every 10 years.     Nutrition:     Eat at least 5 servings of fruits and vegetables each day.    Eat whole-grain bread, whole-wheat pasta and brown rice instead of white grains and rice.    Get adequate Calcium and Vitamin D.      Lifestyle    Exercise at least 150 minutes a week (an average of 30 minutes a day, 5 days a week). This will help you control your weight and prevent disease.    Limit alcohol to one drink per day.    No smoking.     Wear sunscreen to prevent skin cancer.    See your dentist every six months for an exam and cleaning.

## 2021-08-31 NOTE — PROGRESS NOTES
SUBJECTIVE:   CC: Kaia Smith is an 40 year old woman who presents for preventive health visit.       Patient has been advised of split billing requirements and indicates understanding: Yes  Healthy Habits:     Getting at least 3 servings of Calcium per day:  Yes    Bi-annual eye exam:  NO    Dental care twice a year:  Yes    Sleep apnea or symptoms of sleep apnea:  None    Diet:  Regular (no restrictions)    Frequency of exercise:  2-3 days/week    Duration of exercise:  15-30 minutes    Taking medications regularly:  Yes    Medication side effects:  Not applicable    PHQ-2 Total Score: 0    Additional concerns today:  Yes        -lump on middle of neck, comes and goes all summer, some tenderness when pressing on area-very mild. Could feel it when she swallows. Occurred about 1.5 weeks ago.         Today's PHQ-2 Score:   PHQ-2 ( 1999 [employer]) 9/1/2021   Q1: Little interest or pleasure in doing things 0   Q2: Feeling down, depressed or hopeless 0   PHQ-2 Score 0   Q1: Little interest or pleasure in doing things Not at all   Q2: Feeling down, depressed or hopeless Not at all   PHQ-2 Score 0       Abuse: Current or Past (Physical, Sexual or Emotional) - No  Do you feel safe in your environment? Yes    Have you ever done Advance Care Planning? (For example, a Health Directive, POLST, or a discussion with a medical provider or your loved ones about your wishes): No, advance care planning information given to patient to review.  Patient declined advance care planning discussion at this time.    Social History     Tobacco Use     Smoking status: Never Smoker     Smokeless tobacco: Never Used   Substance Use Topics     Alcohol use: Yes     Alcohol/week: 0.0 standard drinks     Comment: occasionally     If you drink alcohol do you typically have >3 drinks per day or >7 drinks per week? No    Alcohol Use 9/1/2021   Prescreen: >3 drinks/day or >7 drinks/week? No   Prescreen: >3 drinks/day or >7 drinks/week? -   No  flowsheet data found.    Reviewed orders with patient.  Reviewed health maintenance and updated orders accordingly - Yes  Lab work is in process    Breast Cancer Screening:  Any new diagnosis of family breast, ovarian, or bowel cancer? No    FHS-7: No flowsheet data found.  click delete button to remove this line now  Mammogram Screening - Offered annual screening and updated Health Maintenance for mutual plan based on risk factor consideration    Pertinent mammograms are reviewed under the imaging tab.    History of abnormal Pap smear: NO - age 30-65 PAP every 5 years with negative HPV co-testing recommended  PAP / HPV Latest Ref Rng & Units 1/7/2019 6/20/2016 5/14/2013   PAP (Historical) - NIL NIL NIL   HPV16 NEG:Negative Negative - -   HPV18 NEG:Negative Negative - -   HRHPV NEG:Negative Negative - -     Reviewed and updated as needed this visit by clinical staff  Tobacco  Allergies  Meds  Problems  Med Hx  Surg Hx  Fam Hx  Soc Hx          Reviewed and updated as needed this visit by Provider    Meds  Problems   Surg Hx  Fam Hx             Review of Systems   Constitutional: Negative for chills and fever.   HENT: Negative for congestion, ear pain, hearing loss and sore throat.    Eyes: Negative for pain and visual disturbance.   Respiratory: Negative for cough and shortness of breath.    Cardiovascular: Negative for chest pain, palpitations and peripheral edema.   Gastrointestinal: Negative for abdominal pain, constipation, diarrhea, heartburn, hematochezia and nausea.   Genitourinary: Negative for dysuria, frequency, genital sores, hematuria and urgency.   Musculoskeletal: Negative for arthralgias, joint swelling and myalgias.   Skin: Negative for rash.   Neurological: Negative for dizziness, weakness, headaches and paresthesias.   Psychiatric/Behavioral: Negative for mood changes. The patient is not nervous/anxious.           OBJECTIVE:   /64   Pulse 75   Temp 97.8  F (36.6  C) (Tympanic)    "Resp 14   Ht 1.67 m (5' 5.75\")   Wt 56.7 kg (125 lb)   LMP 08/18/2021 (Approximate)   SpO2 97%   BMI 20.33 kg/m    Physical Exam  GENERAL: healthy, alert and no distress  EYES: Eyes grossly normal to inspection, PERRL and conjunctivae and sclerae normal  HENT: ear canals and TM's normal, nose and mouth without ulcers or lesions  NECK: no adenopathy, no asymmetry, masses, or scars and thyroid small nodule noted, non-tender, mobile  RESP: lungs clear to auscultation - no rales, rhonchi or wheezes  BREAST: normal without masses, tenderness or nipple discharge and no palpable axillary masses or adenopathy  CV: regular rate and rhythm, normal S1 S2, no S3 or S4, no murmur, click or rub  ABDOMEN: soft, nontender, no hepatosplenomegaly, no masses and bowel sounds normal  MS: no gross musculoskeletal defects noted, no edema  SKIN: no suspicious lesions or rashes  NEURO: Normal strength and tone, mentation intact and speech normal  PSYCH: mentation appears normal, affect normal/bright    Diagnostic Test Results:  Labs reviewed in Epic  No results found for this or any previous visit (from the past 24 hour(s)).    ASSESSMENT/PLAN:   (Z00.00) Routine general medical examination at a health care facility  (primary encounter diagnosis)  Comment: normal exam, labs pending  Plan: Basic metabolic panel  (Ca, Cl, CO2, Creat,         Gluc, K, Na, BUN), Lipid panel reflex to direct        LDL Fasting, Glucose            (E04.1) Thyroid nodule  Comment: noted a nodule, unsure if coming from thyroid or not. Comes and goes 2-3 times over the past few months. 1.5 weeks ago felt like she could feel it when swallowing. We could feel it slightly today, recommend ultrasound and checking labs to rule out more concerning things  Plan: US Thyroid, TSH with free T4 reflex, Thyroid         peroxidase antibody              Patient has been advised of split billing requirements and indicates understanding: Yes  COUNSELING:  Reviewed preventive " "health counseling, as reflected in patient instructions    Estimated body mass index is 20.33 kg/m  as calculated from the following:    Height as of this encounter: 1.67 m (5' 5.75\").    Weight as of this encounter: 56.7 kg (125 lb).        She reports that she has never smoked. She has never used smokeless tobacco.      Counseling Resources:  ATP IV Guidelines  Pooled Cohorts Equation Calculator  Breast Cancer Risk Calculator  BRCA-Related Cancer Risk Assessment: FHS-7 Tool  FRAX Risk Assessment  ICSI Preventive Guidelines  Dietary Guidelines for Americans, 2010  USDA's MyPlate  ASA Prophylaxis  Lung CA Screening    THAO Torres, NP-C  [facility]    "

## 2021-09-01 ENCOUNTER — OFFICE VISIT (OUTPATIENT)
Dept: FAMILY MEDICINE | Facility: CLINIC | Age: 41
End: 2021-09-01
Payer: COMMERCIAL

## 2021-09-01 VITALS
OXYGEN SATURATION: 97 % | WEIGHT: 125 LBS | HEART RATE: 75 BPM | DIASTOLIC BLOOD PRESSURE: 64 MMHG | BODY MASS INDEX: 20.09 KG/M2 | TEMPERATURE: 97.8 F | RESPIRATION RATE: 14 BRPM | HEIGHT: 66 IN | SYSTOLIC BLOOD PRESSURE: 110 MMHG

## 2021-09-01 DIAGNOSIS — E04.1 THYROID NODULE: ICD-10-CM

## 2021-09-01 DIAGNOSIS — Z00.00 ROUTINE GENERAL MEDICAL EXAMINATION AT A HEALTH CARE FACILITY: Primary | ICD-10-CM

## 2021-09-01 LAB
ANION GAP SERPL CALCULATED.3IONS-SCNC: 6 MMOL/L (ref 3–14)
BUN SERPL-MCNC: 14 MG/DL (ref 7–30)
CALCIUM SERPL-MCNC: 9 MG/DL (ref 8.5–10.1)
CHLORIDE BLD-SCNC: 105 MMOL/L (ref 94–109)
CHOLEST SERPL-MCNC: 191 MG/DL
CO2 SERPL-SCNC: 28 MMOL/L (ref 20–32)
CREAT SERPL-MCNC: 0.65 MG/DL (ref 0.52–1.04)
FASTING STATUS PATIENT QL REPORTED: YES
GFR SERPL CREATININE-BSD FRML MDRD: >90 ML/MIN/1.73M2
GLUCOSE BLD-MCNC: 92 MG/DL (ref 70–99)
HDLC SERPL-MCNC: 78 MG/DL
LDLC SERPL CALC-MCNC: 97 MG/DL
NONHDLC SERPL-MCNC: 113 MG/DL
POTASSIUM BLD-SCNC: 4.3 MMOL/L (ref 3.4–5.3)
SODIUM SERPL-SCNC: 139 MMOL/L (ref 133–144)
TRIGL SERPL-MCNC: 82 MG/DL
TSH SERPL DL<=0.005 MIU/L-ACNC: 1.72 MU/L (ref 0.4–4)

## 2021-09-01 PROCEDURE — 99396 PREV VISIT EST AGE 40-64: CPT | Performed by: NURSE PRACTITIONER

## 2021-09-01 PROCEDURE — 86376 MICROSOMAL ANTIBODY EACH: CPT | Performed by: NURSE PRACTITIONER

## 2021-09-01 PROCEDURE — 99214 OFFICE O/P EST MOD 30 MIN: CPT | Mod: 25 | Performed by: NURSE PRACTITIONER

## 2021-09-01 PROCEDURE — 80061 LIPID PANEL: CPT | Performed by: NURSE PRACTITIONER

## 2021-09-01 PROCEDURE — 80048 BASIC METABOLIC PNL TOTAL CA: CPT | Performed by: NURSE PRACTITIONER

## 2021-09-01 PROCEDURE — 36415 COLL VENOUS BLD VENIPUNCTURE: CPT | Performed by: NURSE PRACTITIONER

## 2021-09-01 PROCEDURE — 84443 ASSAY THYROID STIM HORMONE: CPT | Performed by: NURSE PRACTITIONER

## 2021-09-01 ASSESSMENT — ENCOUNTER SYMPTOMS
DYSURIA: 0
FREQUENCY: 0
CHILLS: 0
MYALGIAS: 0
CONSTIPATION: 0
SORE THROAT: 0
PALPITATIONS: 0
FEVER: 0
HEADACHES: 0
EYE PAIN: 0
COUGH: 0
HEARTBURN: 0
DIARRHEA: 0
WEAKNESS: 0
HEMATOCHEZIA: 0
DIZZINESS: 0
NAUSEA: 0
SHORTNESS OF BREATH: 0
JOINT SWELLING: 0
NERVOUS/ANXIOUS: 0
PARESTHESIAS: 0
ARTHRALGIAS: 0
ABDOMINAL PAIN: 0
HEMATURIA: 0

## 2021-09-01 ASSESSMENT — MIFFLIN-ST. JEOR: SCORE: 1249.78

## 2021-09-01 NOTE — RESULT ENCOUNTER NOTE
Hi Kaia,  Your TSH is normal, kidney function is normal.  Cholesterol is also normal.  The thyroid antibody is pending.  Thank you,  THAO Torres, NP-C  [facility]

## 2021-09-02 LAB — THYROPEROXIDASE AB SERPL-ACNC: <10 IU/ML

## 2021-09-02 NOTE — RESULT ENCOUNTER NOTE
Hi Kaia,  Your thyroid antibody came back negative or normal.  Thank you,  THAO Torres, NP-C  [facility]

## 2021-09-03 ENCOUNTER — ANCILLARY PROCEDURE (OUTPATIENT)
Dept: ULTRASOUND IMAGING | Facility: CLINIC | Age: 41
End: 2021-09-03
Attending: NURSE PRACTITIONER
Payer: COMMERCIAL

## 2021-09-03 DIAGNOSIS — E04.1 THYROID NODULE: ICD-10-CM

## 2021-09-03 PROCEDURE — 76536 US EXAM OF HEAD AND NECK: CPT | Performed by: RADIOLOGY

## 2021-09-07 NOTE — RESULT ENCOUNTER NOTE
Kaia Nielsen  Purnima is out of the office today but I wanted to touch base with you on the ultrasound so you do not have to wait.  The results were totally normal.  They actually thought this is more likely a lymph node than a thyroid nodule.  But it appears normal and certainly nothing of concern.  I will make sure Purnima sees the results as well and she may have something to add.  STACEY Renteria M.D.

## 2021-09-18 ENCOUNTER — HEALTH MAINTENANCE LETTER (OUTPATIENT)
Age: 41
End: 2021-09-18

## 2021-11-30 ENCOUNTER — IMMUNIZATION (OUTPATIENT)
Dept: NURSING | Facility: CLINIC | Age: 41
End: 2021-11-30
Payer: COMMERCIAL

## 2021-11-30 PROCEDURE — 90471 IMMUNIZATION ADMIN: CPT

## 2021-11-30 PROCEDURE — 90686 IIV4 VACC NO PRSV 0.5 ML IM: CPT

## 2021-12-23 ENCOUNTER — TELEPHONE (OUTPATIENT)
Dept: DERMATOLOGY | Facility: CLINIC | Age: 41
End: 2021-12-23
Payer: COMMERCIAL

## 2021-12-23 NOTE — TELEPHONE ENCOUNTER
12/23 2nd attempt. Provided phone number [phone] to schedule follow up in about 1 year (around 2/23/2022).    Samra mandel Procedure   Orthopedics, Podiatry, Sports Medicine, ENT/Eye Specialties  [facility]   [phone]

## 2022-01-11 ENCOUNTER — ANCILLARY PROCEDURE (OUTPATIENT)
Dept: GENERAL RADIOLOGY | Facility: CLINIC | Age: 42
End: 2022-01-11
Attending: FAMILY MEDICINE
Payer: COMMERCIAL

## 2022-01-11 ENCOUNTER — OFFICE VISIT (OUTPATIENT)
Dept: FAMILY MEDICINE | Facility: CLINIC | Age: 42
End: 2022-01-11
Payer: COMMERCIAL

## 2022-01-11 VITALS
OXYGEN SATURATION: 97 % | SYSTOLIC BLOOD PRESSURE: 112 MMHG | BODY MASS INDEX: 21.18 KG/M2 | TEMPERATURE: 98.7 F | RESPIRATION RATE: 16 BRPM | HEART RATE: 74 BPM | HEIGHT: 65 IN | WEIGHT: 127.1 LBS | DIASTOLIC BLOOD PRESSURE: 60 MMHG

## 2022-01-11 DIAGNOSIS — M75.81 TENDINITIS OF RIGHT ROTATOR CUFF: ICD-10-CM

## 2022-01-11 DIAGNOSIS — M75.81 TENDINITIS OF RIGHT ROTATOR CUFF: Primary | ICD-10-CM

## 2022-01-11 DIAGNOSIS — Z12.31 ENCOUNTER FOR SCREENING MAMMOGRAM FOR BREAST CANCER: ICD-10-CM

## 2022-01-11 PROCEDURE — 73030 X-RAY EXAM OF SHOULDER: CPT | Mod: RT | Performed by: RADIOLOGY

## 2022-01-11 PROCEDURE — 99214 OFFICE O/P EST MOD 30 MIN: CPT | Performed by: FAMILY MEDICINE

## 2022-01-11 RX ORDER — METHYLPREDNISOLONE 4 MG
TABLET, DOSE PACK ORAL
Qty: 21 TABLET | Refills: 0 | Status: SHIPPED | OUTPATIENT
Start: 2022-01-11 | End: 2022-11-30

## 2022-01-11 RX ORDER — MELOXICAM 7.5 MG/1
7.5 TABLET ORAL DAILY
Qty: 30 TABLET | Refills: 0 | Status: SHIPPED | OUTPATIENT
Start: 2022-01-11 | End: 2022-11-30

## 2022-01-11 ASSESSMENT — MIFFLIN-ST. JEOR: SCORE: 1249.27

## 2022-01-11 NOTE — PROGRESS NOTES
"  Assessment & Plan     Tendinitis of right rotator cuff  Alternate between ice and heat  Physical therapy if not improved.  - XR Shoulder Right 2 Views; Future  - meloxicam (MOBIC) 7.5 MG tablet; Take 1 tablet (7.5 mg) by mouth daily  - methylPREDNISolone (MEDROL DOSEPAK) 4 MG tablet therapy pack; Follow Package Directions    Encounter for screening mammogram for breast cancer  - MA Screening Digital Bilateral; Future      No follow-ups on file.    Avinash Chatman MD  [facility] SAUL Marti is a 41 year old who presents for the following health issues  accompanied by her self.    HPI     Pain History:  When did you first notice your pain? - More than 6 weeks   Have you seen this provider for your pain in the past? No   Where in your body do your have pain? Right shoulder  Are you seeing anyone else for your pain? No  What makes your pain better? Rest  What makes your pain worse? Using, reaching  How has pain affected your ability to work? 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  and 3 boys      PEG Score 1/11/2022   PEG Total Score 1.67         Review of Systems   Constitutional, HEENT, cardiovascular, pulmonary, GI, , musculoskeletal, neuro, skin, endocrine and psych systems are negative, except as otherwise noted.      Objective    /60   Pulse 74   Temp 98.7  F (37.1  C) (Temporal)   Resp 16   Ht 1.662 m (5' 5.43\")   Wt 57.7 kg (127 lb 1.6 oz)   SpO2 97%   BMI 20.87 kg/m    Body mass index is 20.87 kg/m .  Physical Exam  Musculoskeletal:      Right shoulder: Tenderness present. No swelling, deformity, effusion, laceration, bony tenderness or crepitus. Decreased range of motion. Normal strength. Normal pulse.        GENERAL: healthy, alert and no distress    Results for orders placed or performed in visit on 01/11/22   XR Shoulder Right 2 Views     Status: None    Narrative    XR " SHOULDER 2 VIEW RIGHT 1/11/2022 4:49 PM     HISTORY: Tendinitis of right rotator cuff    COMPARISON: None.      Impression    IMPRESSION: No fractures are evident. Normal glenohumeral alignment.  The acromioclavicular joint is unremarkable.     CALEB GREY MD         SYSTEM ID:  [id number]

## 2022-01-11 NOTE — PATIENT INSTRUCTIONS
Patient Education     Understanding Rotator Cuff Tendonitis    The rotator cuff is a group of 4 muscles and tendons in the shoulder. Tendons are tough tissues that connect muscles to bone. The 4 muscles and their tendons form a  cuff  around the head of the upper arm bone. The rotator cuff connects the upper arm to the shoulder blade. It keeps the shoulder joint stable and gives it strength. It also helps the shoulder joint with certain movements. These include reaching the arm over the head and rotating the arm.  If tendons are injured or strained, they may get irritated and swollen (inflamed). This is called tendonitis. Rotator cuff tendonitis may cause shoulder pain. It may make it hard to move your shoulder.  What causes rotator cuff tendonitis?  When the rotator cuff tendons are injured or overworked it causes tendonitis. The most common cause of injury is repetitive overhead activities. These can be work-related activities such as reaching, pushing, or lifting. Or they can be sports-related activities such as throwing, swimming, or lifting weights.  Symptoms of rotator cuff tendonitis  Pain on the side of the upper arm at the shoulder is the most common symptom. Pain may get worse with overhead movements. Or it can get worse when you raise the arm above shoulder level. It may also hurt to lie on the shoulder at night.  Treatment for rotator cuff tendonitis  Treatment may include:    Active rest. This lets the rotator cuff heal. Active rest means using your arm and shoulder, but not doing activities that cause pain. These might be reaching overhead or sleeping on the shoulder.    Cold packs. Putting ice packs on the shoulder helps reduce swelling and ease pain.    Medicines. Prescription or over-the-counter medicines can help ease pain and swelling. NSAIDs (nonsteroidal anti-inflammatory drugs) are the most common medicines used. They may be taken as pills. Or they may be put on the skin as a gel, cream, or  patch.    Arm and shoulder exercises. These help keep the shoulder joint moving as it heals. They also help improve the strength of muscles around the joint.  Possible complications  You may be tempted to stop using your shoulder completely to prevent pain. But doing so may lead to a condition called frozen shoulder. To help prevent this, follow instructions you are given for active rest and for doing exercises to help your shoulder heal.  When to call your healthcare provider  Call your healthcare provider right away if you have any of these:    Fever of 100.4 F (38 C) or higher, or as advised by your healthcare provider    Chills    Symptoms that don t get better, or get worse    New symptoms  Janes last reviewed this educational content on 6/1/2019 2000-2021 [employer]. All rights reserved. This information is not intended as a substitute for professional medical care. Always follow your healthcare professional's instructions.

## 2022-01-29 ENCOUNTER — LAB REQUISITION (OUTPATIENT)
Dept: LAB | Facility: CLINIC | Age: 42
End: 2022-01-29

## 2022-01-29 PROCEDURE — U0005 INFEC AGEN DETEC AMPLI PROBE: HCPCS | Performed by: INTERNAL MEDICINE

## 2022-01-30 ENCOUNTER — APPOINTMENT (OUTPATIENT)
Dept: URGENT CARE | Facility: CLINIC | Age: 42
End: 2022-01-30
Payer: COMMERCIAL

## 2022-01-30 LAB — SARS-COV-2 RNA RESP QL NAA+PROBE: NEGATIVE

## 2022-02-11 ENCOUNTER — TRANSFERRED RECORDS (OUTPATIENT)
Dept: MULTI SPECIALTY CLINIC | Facility: CLINIC | Age: 42
End: 2022-02-11

## 2022-02-11 LAB — PAP SMEAR - HIM PATIENT REPORTED: NORMAL

## 2022-03-21 ENCOUNTER — LAB REQUISITION (OUTPATIENT)
Dept: LAB | Facility: CLINIC | Age: 42
End: 2022-03-21

## 2022-03-21 PROCEDURE — U0005 INFEC AGEN DETEC AMPLI PROBE: HCPCS | Performed by: INTERNAL MEDICINE

## 2022-03-22 LAB — SARS-COV-2 RNA RESP QL NAA+PROBE: NEGATIVE

## 2022-05-25 ENCOUNTER — LAB REQUISITION (OUTPATIENT)
Dept: LAB | Facility: CLINIC | Age: 42
End: 2022-05-25

## 2022-05-25 LAB — SARS-COV-2 RNA RESP QL NAA+PROBE: NEGATIVE

## 2022-05-25 PROCEDURE — U0005 INFEC AGEN DETEC AMPLI PROBE: HCPCS | Performed by: INTERNAL MEDICINE

## 2022-05-27 ENCOUNTER — LAB REQUISITION (OUTPATIENT)
Dept: LAB | Facility: CLINIC | Age: 42
End: 2022-05-27

## 2022-05-27 LAB — SARS-COV-2 RNA RESP QL NAA+PROBE: POSITIVE

## 2022-05-27 PROCEDURE — U0003 INFECTIOUS AGENT DETECTION BY NUCLEIC ACID (DNA OR RNA); SEVERE ACUTE RESPIRATORY SYNDROME CORONAVIRUS 2 (SARS-COV-2) (CORONAVIRUS DISEASE [COVID-19]), AMPLIFIED PROBE TECHNIQUE, MAKING USE OF HIGH THROUGHPUT TECHNOLOGIES AS DESCRIBED BY CMS-2020-01-R: HCPCS | Performed by: INTERNAL MEDICINE

## 2022-09-03 ENCOUNTER — OFFICE VISIT (OUTPATIENT)
Dept: URGENT CARE | Facility: URGENT CARE | Age: 42
End: 2022-09-03

## 2022-09-03 ENCOUNTER — ANCILLARY PROCEDURE (OUTPATIENT)
Dept: GENERAL RADIOLOGY | Facility: CLINIC | Age: 42
End: 2022-09-03
Attending: PHYSICIAN ASSISTANT
Payer: COMMERCIAL

## 2022-09-03 VITALS
DIASTOLIC BLOOD PRESSURE: 83 MMHG | TEMPERATURE: 98.6 F | SYSTOLIC BLOOD PRESSURE: 124 MMHG | BODY MASS INDEX: 22.14 KG/M2 | HEART RATE: 93 BPM | WEIGHT: 134.8 LBS | OXYGEN SATURATION: 99 %

## 2022-09-03 DIAGNOSIS — S49.92XA SHOULDER INJURY, LEFT, INITIAL ENCOUNTER: ICD-10-CM

## 2022-09-03 DIAGNOSIS — S42.035A CLOSED NONDISPLACED FRACTURE OF ACROMIAL END OF LEFT CLAVICLE, INITIAL ENCOUNTER: Primary | ICD-10-CM

## 2022-09-03 PROCEDURE — 73030 X-RAY EXAM OF SHOULDER: CPT | Mod: TC | Performed by: RADIOLOGY

## 2022-09-03 PROCEDURE — 99214 OFFICE O/P EST MOD 30 MIN: CPT | Performed by: PHYSICIAN ASSISTANT

## 2022-09-03 RX ORDER — HYDROCODONE BITARTRATE AND ACETAMINOPHEN 5; 325 MG/1; MG/1
1 TABLET ORAL EVERY 6 HOURS PRN
Qty: 10 TABLET | Refills: 0 | Status: SHIPPED | OUTPATIENT
Start: 2022-09-03 | End: 2022-09-06

## 2022-09-03 RX ORDER — NAPROXEN 500 MG/1
500 TABLET ORAL 2 TIMES DAILY WITH MEALS
Qty: 20 TABLET | Refills: 1 | Status: SHIPPED | OUTPATIENT
Start: 2022-09-03 | End: 2022-11-30

## 2022-09-03 ASSESSMENT — PAIN SCALES - GENERAL: PAINLEVEL: SEVERE PAIN (6)

## 2022-09-03 NOTE — PROGRESS NOTES
Chief Complaint   Patient presents with     Urgent Care     Shoulder Pain     Left shoulder injury/back collarbone-fell down the stairs last night          X-ray-I see a nondisplaced distal clavicle fracture.  Do not think there is joint involvement.    Results for orders placed or performed in visit on 09/03/22   XR Shoulder Left G/E 3 Views     Status: None    Narrative    EXAM: XR SHOULDER LEFT G/E 3 VIEWS  LOCATION: [facility]  DATE/TIME: 09/03/2022, 11:35 AM    INDICATION: Left shoulder injury and pain.  COMPARISON: None.      Impression    IMPRESSION:   1.  Acute nondisplaced fracture of the left distal clavicle. No definitive fracture extension into the acromioclavicular joint space.  2.  Normal joint spacing and alignment.              ASSESSMENT:    ICD-10-CM    1. Closed nondisplaced fracture of acromial end of left clavicle, initial encounter  S42.035A Orthopedic  Referral     HYDROcodone-acetaminophen (NORCO) 5-325 MG tablet   2. Shoulder injury, left, initial encounter  S49.92XA XR Shoulder Left G/E 3 Views     naproxen (NAPROSYN) 500 MG tablet     Orthopedic  Referral     HYDROcodone-acetaminophen (NORCO) 5-325 MG tablet             PLAN: Clavicular fracture.  Ice, sling.  No work with left arm.  Follow-up with Ortho.  Prescription naproxen.  Hardcopy prescription for Norco to fill as needed for any severe pain.  Advised about symptoms which might herald more serious problems.            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  Put on the sling her son had for prior shoulder injury.  No numbness or tingling.  No head injury.    No Known Allergies    Past Medical History:   Diagnosis Date     Acute mastitis of left breast 2/7/2014     H/O abnormal cervical Papanicolaou smear 01/01/1999     Menarche     13 y/o     Traumatic injury  during pregnancy 2/12/2013       meloxicam (MOBIC) 7.5 MG tablet, Take 1 tablet (7.5 mg) by mouth daily (Patient not taking: Reported on 9/3/2022)  methylPREDNISolone (MEDROL DOSEPAK) 4 MG tablet therapy pack, Follow Package Directions (Patient not taking: Reported on 9/3/2022)    No current facility-administered medications on file prior to visit.      Social History     Tobacco Use     Smoking status: Never Smoker     Smokeless tobacco: Never Used   Vaping Use     Vaping Use: Never used   Substance Use Topics     Alcohol use: Yes     Alcohol/week: 0.0 standard drinks     Comment: occasionally     Drug use: No       ROS:  Gen: no fevers  Musculoskel: + as above  Skin: as above    OBJECTIVE:  /83 (BP Location: Right arm, Patient Position: Sitting, Cuff Size: Adult Regular)   Pulse 93   Temp 98.6  F (37  C) (Tympanic)   Wt 61.1 kg (134 lb 12.8 oz)   SpO2 99%   BMI 22.14 kg/m     General:   awake, alert, and cooperative.  NAD.   Head: Normocephalic, atraumatic.  Eyes: Conjunctiva clear,   MS: Left shoulder in sling.  Tender distal clavicle.  No obvious deformity.  Good palpable radial pulse.  Sensation to soft touch intact.  Very guarded with any type of shoulder movement.    Neuro: Alert and oriented - normal speech.      Mary Henriquez PA-C

## 2022-09-07 NOTE — TELEPHONE ENCOUNTER
DIAGNOSIS: Left collarbone fx   APPOINTMENT DATE: 09/16/2022   NOTES STATUS DETAILS   OFFICE NOTE from referring provider Internal 09/03/2022 [facility] urgent [facility]   OFFICE NOTE from other specialist N/A    DISCHARGE SUMMARY from hospital N/A    DISCHARGE REPORT from the ER N/A    OPERATIVE REPORT N/A    EMG report N/A    MEDICATION LIST N/A    MRI N/A    DEXA (osteoporosis/bone health) N/A    CT SCAN N/A    XRAYS (IMAGES & REPORTS) Internal 09/03/2022 [facility] shoulder

## 2022-09-14 NOTE — PROGRESS NOTES
Kaia Smith  :  1980  DOS: 2022  MRN: [id number]    Sports Medicine Clinic Visit    PCP: No Ref-Primary, Physician    Kaia Smith is a 41 year old Right hand dominant female who is seen in consultation at the request of  Mary Henriquez PA-C presenting with left collarbone fracture.     Ms. Smith presents with left shoulder pain secondary to a left sided clavicle fracture sustained on 22 after a fall tripping over her dog. She was seen in the ED on 9/3/22 where XR showed a closed nondisplaced fracture of the distal clavicle. She was given a sling and Rx for Naproxen and Norco.     Since the ED visit, pain has been using a sling for comfort. For the past few days, she has been weaning out of the sling. She notes trying to use her left arm more but keeping her arm by her side. She is limited in her ROM due to pain and stiffness. Denies numbness, tingling, paresthesias, weakness, chest pain, SOB. Has been affecting her sleep. Pain is improved with sling use, ice and naproxen. No prior similar injuries.     Social History: currently employed as a nurse    Review of Systems  Musculoskeletal: as above  Remainder of review of systems is negative including constitutional, CV, pulmonary, GI, Skin and Neurologic except as noted in HPI or medical history.    Past Medical History:   Diagnosis Date     Acute mastitis of left breast 2014     H/O abnormal cervical Papanicolaou smear 1999     Menarche     13 y/o     Traumatic injury during pregnancy 2013     Past Surgical History:   Procedure Laterality Date     APPENDECTOMY      13 y/o      SECTION  3/19/2013    Procedure:  SECTION;;  Surgeon: Maddie Stallworth MD;  [facility]     COSMETIC MAMMOPLASTY AUGMENTATION BILATERAL Bilateral 2016    Silicone     CRYOCAUTERY OF CERVIX N/A age 18     HYSTEROSCOPY,DIAGNOSTIC  2008     REMV PILONIDAL LESION SIMPLE  10/20/2005     Family History   Problem Relation  Age of Onset     Hypertension Mother      Cancer Mother         uterine     Cerebrovascular Disease Father      Cancer Father         lymphoma and bladder     Hypertension Father      Alzheimer Disease Maternal Grandmother      Cerebrovascular Disease Maternal Grandmother      Alzheimer Disease Paternal Grandfather      Diabetes No family hx of      Coronary Artery Disease No family hx of      Hyperlipidemia No family hx of      Breast Cancer No family hx of      Colon Cancer No family hx of      Prostate Cancer No family hx of      Anxiety Disorder No family hx of      Depression No family hx of      Thyroid Disease No family hx of      Genetic Disorder No family hx of        Objective  BP 99/67   Pulse 79   Wt 61.1 kg (134 lb 12.8 oz)   BMI 22.14 kg/m        General: healthy, alert and in no distress      HEENT: no scleral icterus or conjunctival erythema     Skin: no suspicious lesions or rash. No jaundice.     CV: regular rhythm by palpation, 2+ distal pulses, no pedal edema      Resp: normal respiratory effort without conversational dyspnea     Psych: normal mood and affect      Gait: nonantalgic, appropriate coordination and balance     Neuro: normal light touch sensory exam of the extremities including all peripheral nerve distributions.     MSK L Shoulder: Ecchymosis present over left anterior shoulder. Holding the left arm in adduction and internal rotation with flexed elbow. TTP over the AC joint and distal clavicle. ROM limited to about 15 deg SAb and 30 deg SF due to pain. Full AROM of elbow, wrist, and all digits. Strength 5/5 for LUE below elbow, deferred formal shoulder strength testing.       Radiology  I personally reviewed the relevant imaging, including XR L Shoulder (9/3/22) and agree with the interpretation of a closed nondisplaced distal/lateral extraarticular clavicle fracture.     Recent Results (from the past 744 hour(s))   XR Shoulder Left G/E 3 Views    Narrative    EXAM: XR SHOULDER  LEFT G/E 3 VIEWS  LOCATION: [facility]  DATE/TIME: 09/03/2022, 11:35 AM    INDICATION: Left shoulder injury and pain.  COMPARISON: None.      Impression    IMPRESSION:   1.  Acute nondisplaced fracture of the left distal clavicle. No definitive fracture extension into the acromioclavicular joint space.  2.  Normal joint spacing and alignment.          Assessment:  1. Closed nondisplaced fracture of acromial end of left clavicle, initial encounter    2. Shoulder injury, left, initial encounter        Plan:  Kaia Smith is a 41 year old female that presents with left shoulder pain secondary to a closed nondisplaced distal clavicle fracture, sustained 2 weeks ago. Discussed the nature of the condition and treatment options and mutually agreed upon the following plan:    - Imaging:  Will follow up with serial XR to evaluate healing. Expect healing in approximately 6-8 weeks.   - Bracing:  May use sling for comfort for now, will increase ROM gradually. Advised to keep the left arm limited in ROM to less than 45 degrees of shoulder flexion.   - Medications:  Continue NSAIDs PRN.   - Therapy:  Placed referral for PT to start after 3-4 weeks post-injury to work on ROM, then strengthening exercises to begin after we get radiographic evidence of healing.   - Modalities:  May use ice packs PRN.   - Surgery: Discussed with our Orthopedics Team and suspect that this will heal well nonoperatively.   - Activity: Encouraged to remain active and participate in regular activities as symptoms allow. May do gentle ROM up to 40 degrees of shoulder flexion and abduction. AVOID shoulder flexion > 45 degrees until radiographic evidence of healing.   - Follow up in 4 weeks for repeat XR, re-evaluation and update to treatment plan. Patient has clinic contact information for questions or concerns.       Pascual Powell DO  [facility] - Sports Medicine  Department of Orthopedic Surgery      Disclaimer: This note consists of symbols derived from keyboarding, dictation and/or voice recognition software. As a result, there may be errors in the script that have gone undetected. Please consider this when interpreting information found in this chart.

## 2022-09-16 ENCOUNTER — PRE VISIT (OUTPATIENT)
Dept: ORTHOPEDICS | Facility: CLINIC | Age: 42
End: 2022-09-16

## 2022-09-16 ENCOUNTER — OFFICE VISIT (OUTPATIENT)
Dept: ORTHOPEDICS | Facility: CLINIC | Age: 42
End: 2022-09-16
Attending: PHYSICIAN ASSISTANT
Payer: COMMERCIAL

## 2022-09-16 ENCOUNTER — ANCILLARY PROCEDURE (OUTPATIENT)
Dept: GENERAL RADIOLOGY | Facility: CLINIC | Age: 42
End: 2022-09-16
Attending: STUDENT IN AN ORGANIZED HEALTH CARE EDUCATION/TRAINING PROGRAM
Payer: COMMERCIAL

## 2022-09-16 VITALS
HEART RATE: 79 BPM | SYSTOLIC BLOOD PRESSURE: 99 MMHG | DIASTOLIC BLOOD PRESSURE: 67 MMHG | BODY MASS INDEX: 22.14 KG/M2 | WEIGHT: 134.8 LBS

## 2022-09-16 DIAGNOSIS — S49.92XA SHOULDER INJURY, LEFT, INITIAL ENCOUNTER: ICD-10-CM

## 2022-09-16 DIAGNOSIS — S42.035A CLOSED NONDISPLACED FRACTURE OF ACROMIAL END OF LEFT CLAVICLE, INITIAL ENCOUNTER: ICD-10-CM

## 2022-09-16 DIAGNOSIS — S42.035A CLOSED NONDISPLACED FRACTURE OF ACROMIAL END OF LEFT CLAVICLE, INITIAL ENCOUNTER: Primary | ICD-10-CM

## 2022-09-16 PROCEDURE — 73000 X-RAY EXAM OF COLLAR BONE: CPT | Mod: LT | Performed by: RADIOLOGY

## 2022-09-16 PROCEDURE — 99203 OFFICE O/P NEW LOW 30 MIN: CPT | Performed by: STUDENT IN AN ORGANIZED HEALTH CARE EDUCATION/TRAINING PROGRAM

## 2022-09-16 ASSESSMENT — PAIN SCALES - GENERAL: PAINLEVEL: MODERATE PAIN (5)

## 2022-09-16 NOTE — PATIENT INSTRUCTIONS
You were seen today for your left distal clavicle fracture.   As discussed in clinic, you may work on range of motion of the left arm, but please keep shoulder flexion (straight forward reaching) to less than 45 degrees. Avoid lifting heavy objects in the left hand as well.   Physical therapy referral placed, feel free to call and schedule to start in 1 week. [phone]  Follow up in 4 weeks for a repeat x-ray, re-evaluation and update to treatment plan.   Please call the clinic for any questions or concerns.

## 2022-09-16 NOTE — LETTER
2022         RE: Kaia Smith  [address] [place]  [place] [address]        Dear Colleague,    Thank you for referring your patient, Kaia Smith, to the [facility] [place]. Please see a copy of my visit note below.    Kaia Smith  :  1980  DOS: 2022  MRN: [id number]    Sports Medicine Clinic Visit    PCP: No Ref-Primary, Physician    Kaia Smith is a 41 year old Right hand dominant female who is seen in consultation at the request of  Mary Henriquez PA-C presenting with left collarbone fracture.     Ms. Smith presents with left shoulder pain secondary to a left sided clavicle fracture sustained on 22 after a fall tripping over her dog. She was seen in the ED on 9/3/22 where XR showed a closed nondisplaced fracture of the distal clavicle. She was given a sling and Rx for Naproxen and Norco.     Since the ED visit, pain has been using a sling for comfort. For the past few days, she has been weaning out of the sling. She notes trying to use her left arm more but keeping her arm by her side. She is limited in her ROM due to pain and stiffness. Denies numbness, tingling, paresthesias, weakness, chest pain, SOB. Has been affecting her sleep. Pain is improved with sling use, ice and naproxen. No prior similar injuries.     Social History: currently employed as a nurse    Review of Systems  Musculoskeletal: as above  Remainder of review of systems is negative including constitutional, CV, pulmonary, GI, Skin and Neurologic except as noted in HPI or medical history.    Past Medical History:   Diagnosis Date     Acute mastitis of left breast 2014     H/O abnormal cervical Papanicolaou smear 1999     Menarche     11 y/o     Traumatic injury during pregnancy 2013     Past Surgical History:   Procedure Laterality Date     APPENDECTOMY      13 y/o      SECTION  3/19/2013    Procedure:  SECTION;;  Surgeon: Basim  Maddie Kaur MD;  [facility]     COSMETIC MAMMOPLASTY AUGMENTATION BILATERAL Bilateral 2016    Silicone     CRYOCAUTERY OF CERVIX N/A age 18     HYSTEROSCOPY,DIAGNOSTIC  2/21/2008     REMV PILONIDAL LESION SIMPLE  10/20/2005     Family History   Problem Relation Age of Onset     Hypertension Mother      Cancer Mother         uterine     Cerebrovascular Disease Father      Cancer Father         lymphoma and bladder     Hypertension Father      Alzheimer Disease Maternal Grandmother      Cerebrovascular Disease Maternal Grandmother      Alzheimer Disease Paternal Grandfather      Diabetes No family hx of      Coronary Artery Disease No family hx of      Hyperlipidemia No family hx of      Breast Cancer No family hx of      Colon Cancer No family hx of      Prostate Cancer No family hx of      Anxiety Disorder No family hx of      Depression No family hx of      Thyroid Disease No family hx of      Genetic Disorder No family hx of        Objective  BP 99/67   Pulse 79   Wt 61.1 kg (134 lb 12.8 oz)   BMI 22.14 kg/m        General: healthy, alert and in no distress      HEENT: no scleral icterus or conjunctival erythema     Skin: no suspicious lesions or rash. No jaundice.     CV: regular rhythm by palpation, 2+ distal pulses, no pedal edema      Resp: normal respiratory effort without conversational dyspnea     Psych: normal mood and affect      Gait: nonantalgic, appropriate coordination and balance     Neuro: normal light touch sensory exam of the extremities including all peripheral nerve distributions.     MSK L Shoulder: Ecchymosis present over left anterior shoulder. Holding the left arm in adduction and internal rotation with flexed elbow. TTP over the AC joint and distal clavicle. ROM limited to about 15 deg SAb and 30 deg SF due to pain. Full AROM of elbow, wrist, and all digits. Strength 5/5 for LUE below elbow, deferred formal shoulder strength testing.       Radiology  I personally reviewed the  relevant imaging, including XR L Shoulder (9/3/22) and agree with the interpretation of a closed nondisplaced distal/lateral extraarticular clavicle fracture.     Recent Results (from the past 744 hour(s))   XR Shoulder Left G/E 3 Views    Narrative    EXAM: XR SHOULDER LEFT G/E 3 VIEWS  LOCATION: [facility]  DATE/TIME: 09/03/2022, 11:35 AM    INDICATION: Left shoulder injury and pain.  COMPARISON: None.      Impression    IMPRESSION:   1.  Acute nondisplaced fracture of the left distal clavicle. No definitive fracture extension into the acromioclavicular joint space.  2.  Normal joint spacing and alignment.          Assessment:  1. Closed nondisplaced fracture of acromial end of left clavicle, initial encounter    2. Shoulder injury, left, initial encounter        Plan:  Kaia Smith is a 41 year old female that presents with left shoulder pain secondary to a closed nondisplaced distal clavicle fracture, sustained 2 weeks ago. Discussed the nature of the condition and treatment options and mutually agreed upon the following plan:    - Imaging:  Will follow up with serial XR to evaluate healing. Expect healing in approximately 6-8 weeks.   - Bracing:  May use sling for comfort for now, will increase ROM gradually. Advised to keep the left arm limited in ROM to less than 45 degrees of shoulder flexion.   - Medications:  Continue NSAIDs PRN.   - Therapy:  Placed referral for PT to start after 3-4 weeks post-injury to work on ROM, then strengthening exercises to begin after we get radiographic evidence of healing.   - Modalities:  May use ice packs PRN.   - Surgery: Discussed with our Orthopedics Team and suspect that this will heal well nonoperatively.   - Activity: Encouraged to remain active and participate in regular activities as symptoms allow. May do gentle ROM up to 40 degrees of shoulder flexion and abduction. AVOID shoulder flexion > 45 degrees until radiographic evidence of  healing.   - Follow up in 4 weeks for repeat XR, re-evaluation and update to treatment plan. Patient has clinic contact information for questions or concerns.       Pascual Powell DO  [facility] - Sports Medicine  Department of Orthopedic Surgery     Disclaimer: This note consists of symbols derived from keyboarding, dictation and/or voice recognition software. As a result, there may be errors in the script that have gone undetected. Please consider this when interpreting information found in this chart.        Again, thank you for allowing me to participate in the care of your patient.        Sincerely,        Pascual Powell DO

## 2022-09-26 ENCOUNTER — TELEPHONE (OUTPATIENT)
Dept: TRANSPLANT | Facility: CLINIC | Age: 42
End: 2022-09-26

## 2022-09-26 NOTE — TELEPHONE ENCOUNTER
Contacted Kaia who had some feedback for the . I said I would relay the message. She appreciated the call.   [FreeTextEntry1] : IMPRESSION:\par Chronic headaches and fatigue.\par Were recommended for catheter angiogram to assess for venous sinus stenosis and IIH.\par BOLT did not show high ICP.\par \par We discussed the indication and value of catheter angiogram. My opinion is that the symptoms do not warrant investigation with catheter angiography at this juncture, especially give then recent CTA findings.\par \par PLAN:\par Will discuss plan of care with Dr. Crain\par Will defer catheter angiogram\par Follow-up with me as needed

## 2022-09-26 NOTE — TELEPHONE ENCOUNTER
Patient Call: General  Route to LPN    Reason for call: patient called looking to speak with Lesley in regards to her  who is currently being seen by the transplant team and they have questions about the donor process.    Call back needed? Yes    Return Call Needed  Same as documented in contacts section  When to return call?: Greater than one day: Route standard priority

## 2022-09-27 ENCOUNTER — ANCILLARY PROCEDURE (OUTPATIENT)
Dept: MAMMOGRAPHY | Facility: CLINIC | Age: 42
End: 2022-09-27
Payer: COMMERCIAL

## 2022-09-27 DIAGNOSIS — Z12.31 VISIT FOR SCREENING MAMMOGRAM: ICD-10-CM

## 2022-09-27 PROCEDURE — 77067 SCR MAMMO BI INCL CAD: CPT | Mod: GC | Performed by: RADIOLOGY

## 2022-09-27 PROCEDURE — 77063 BREAST TOMOSYNTHESIS BI: CPT | Mod: GC | Performed by: RADIOLOGY

## 2022-10-05 NOTE — PROGRESS NOTES
Kaia Smith  :  1980  DOS: 10/14/2022  MRN: [id number]    Sports Medicine Clinic Visit    Interim History 2022  Kaia Smith is now 6 weeks out from injury.  Since last visit on 2022, been doing very well overall.  Pain has been minimal and she has not needed any oral pain medication.  She discontinued use of a sling.  She has been actively using the left arm for range of motion activities including abduction, maintaining restrictions for forward flexion.  Has been avoiding heavy lifting with the left arm.  She notes that her AC joint is no longer tender to palpation.  Sleeping well without interruption.  Feeling close to back to her self.  Denies numbness, tingling, paresthesias, weakness, radicular shooting pain, chest pain, shortness of breath.  No other concerns or questions today.      Initial visit - 2022     PCP: No Ref-Primary, Physician    Kaia Smith is a 41 year old Right hand dominant female who is seen in consultation at the request of  Mary Henriquez PA-C presenting with left collarbone fracture.     Ms. Smith presents with left shoulder pain secondary to a left sided clavicle fracture sustained on 22 after a fall tripping over her dog. She was seen in the ED on 9/3/22 where XR showed a closed nondisplaced fracture of the distal clavicle. She was given a sling and Rx for Naproxen and Norco.     Since the ED visit, pain has been using a sling for comfort. For the past few days, she has been weaning out of the sling. She notes trying to use her left arm more but keeping her arm by her side. She is limited in her ROM due to pain and stiffness. Denies numbness, tingling, paresthesias, weakness, chest pain, SOB. Has been affecting her sleep. Pain is improved with sling use, ice and naproxen. No prior similar injuries.     Social History: currently employed as a nurse        Review of Systems  Musculoskeletal: as above  Remainder of review of  systems is negative including constitutional, CV, pulmonary, GI, Skin and Neurologic except as noted in HPI or medical history.    Past Medical History:   Diagnosis Date     Acute mastitis of left breast 2014     H/O abnormal cervical Papanicolaou smear 1999     Menarche     13 y/o     Traumatic injury during pregnancy 2013     Past Surgical History:   Procedure Laterality Date     APPENDECTOMY      15 y/o      SECTION  3/19/2013    Procedure:  SECTION;;  Surgeon: Maddie Stallworth MD;  [facility]: [facility]     COSMETIC MAMMOPLASTY AUGMENTATION BILATERAL Bilateral 2016    Silicone     CRYOCAUTERY OF CERVIX N/A age 18     HYSTEROSCOPY,DIAGNOSTIC  2008     REMV PILONIDAL LESION SIMPLE  10/20/2005     Family History   Problem Relation Age of Onset     Hypertension Mother      Cancer Mother         uterine     Cerebrovascular Disease Father      Cancer Father         lymphoma and bladder     Hypertension Father      Alzheimer Disease Maternal Grandmother      Cerebrovascular Disease Maternal Grandmother      Alzheimer Disease Paternal Grandfather      Diabetes No family hx of      Coronary Artery Disease No family hx of      Hyperlipidemia No family hx of      Breast Cancer No family hx of      Colon Cancer No family hx of      Prostate Cancer No family hx of      Anxiety Disorder No family hx of      Depression No family hx of      Thyroid Disease No family hx of      Genetic Disorder No family hx of        Objective  /76   Wt 61.1 kg (134 lb 12.8 oz)   BMI 22.14 kg/m        General: healthy, alert and in no distress      HEENT: no scleral icterus or conjunctival erythema     Skin: no suspicious lesions or rash. No jaundice.     CV: regular rhythm by palpation, 2+ distal pulses, no pedal edema      Resp: normal respiratory effort without conversational dyspnea     Psych: normal mood and affect      Gait: nonantalgic, appropriate coordination and balance     Neuro: normal  light touch sensory exam of the extremities including all peripheral nerve distributions.     MSK L Shoulder: No ecchymosis, swelling, deformity present over left anterior shoulder. NTTP over the AC joint, distal clavicle or elsewhere today. Full AROM of the neck, shoulder, elbow, wrist, and all digits without pain. Strength 5/5 for LUE including Sab, SIR, SER, EF, EE, WE, WF, FDI, ADM, FPL. -Kaplan, -Neers, -Scarf, -Empty Can, -Faulkner.       Radiology  I independently reviewed today's new imaging, including XR left clavicle, with the interpretation of a mildly displaced distal left clavicle fracture with modest healing response evidence by slight callus formation, no significant advancement in displacement.    Recent Results (from the past 744 hour(s))   XR Shoulder Left G/E 3 Views    Narrative    EXAM: XR SHOULDER LEFT G/E 3 VIEWS  LOCATION: [facility]  DATE/TIME: 09/03/2022, 11:35 AM    INDICATION: Left shoulder injury and pain.  COMPARISON: None.      Impression    IMPRESSION:   1.  Acute nondisplaced fracture of the left distal clavicle. No definitive fracture extension into the acromioclavicular joint space.  2.  Normal joint spacing and alignment.          Assessment:  1. Closed nondisplaced fracture of acromial end of left clavicle with routine healing, subsequent encounter        Plan:  Kaia Smith is a 41 year old female that presents with left shoulder pain secondary to a closed nondisplaced distal clavicle fracture, now 6 weeks from injury.  Clinically, she is doing very well with no pain and near-normal function of her left arm without pain or limitation. Discussed the nature of the condition and treatment options and mutually agreed upon the following plan:    - Imaging: Radiographs today show evidence of an appropriate healing response based on this type of fracture, clinically doing very well, no need for additional imaging.  - Bracing: Okay to discontinue sling  use altogether.  - Medications:  Continue NSAIDs PRN.   - Therapy: Has not started physical therapy yet, okay to start PT for ROM stretching, strengthening and stabilization of the anterior shoulder complex to advance from range of motion to strengthening exercises since we have evidence of radiographic healing.  - Modalities:  May use ice packs PRN.   - Activity: Encouraged to progress slowly from regular range of motion activities to strengthening exercises, let pain be the guide.  Okay to remain active and participate in regular activities as symptoms allow.   - Follow up as needed in the future if pain returns or worsens, or she is unable to progress.  Patient has clinic contact information for questions or concerns.       Pascual Powell DO  [facility] - [facility] Medicine  Department of Orthopedic Surgery

## 2022-10-14 ENCOUNTER — OFFICE VISIT (OUTPATIENT)
Dept: ORTHOPEDICS | Facility: CLINIC | Age: 42
End: 2022-10-14
Payer: COMMERCIAL

## 2022-10-14 ENCOUNTER — ANCILLARY PROCEDURE (OUTPATIENT)
Dept: GENERAL RADIOLOGY | Facility: CLINIC | Age: 42
End: 2022-10-14
Attending: STUDENT IN AN ORGANIZED HEALTH CARE EDUCATION/TRAINING PROGRAM
Payer: COMMERCIAL

## 2022-10-14 VITALS — SYSTOLIC BLOOD PRESSURE: 114 MMHG | WEIGHT: 134.8 LBS | BODY MASS INDEX: 22.14 KG/M2 | DIASTOLIC BLOOD PRESSURE: 76 MMHG

## 2022-10-14 DIAGNOSIS — S42.035A CLOSED NONDISPLACED FRACTURE OF ACROMIAL END OF LEFT CLAVICLE, INITIAL ENCOUNTER: Primary | ICD-10-CM

## 2022-10-14 DIAGNOSIS — S42.035D CLOSED NONDISPLACED FRACTURE OF ACROMIAL END OF LEFT CLAVICLE WITH ROUTINE HEALING, SUBSEQUENT ENCOUNTER: Primary | ICD-10-CM

## 2022-10-14 DIAGNOSIS — S42.035A CLOSED NONDISPLACED FRACTURE OF ACROMIAL END OF LEFT CLAVICLE, INITIAL ENCOUNTER: ICD-10-CM

## 2022-10-14 PROCEDURE — 99213 OFFICE O/P EST LOW 20 MIN: CPT | Performed by: STUDENT IN AN ORGANIZED HEALTH CARE EDUCATION/TRAINING PROGRAM

## 2022-10-14 PROCEDURE — 73000 X-RAY EXAM OF COLLAR BONE: CPT | Mod: LT | Performed by: RADIOLOGY

## 2022-10-14 NOTE — LETTER
10/14/2022         RE: Kaia Smith  [address]  [facility] [address]        Dear Colleague,    Thank you for referring your patient, Kaia Smith, to the [facility]. Please see a copy of my visit note below.    Kaia Smith  :  1980  DOS: 10/14/2022  MRN: [id number]    Sports Medicine Clinic Visit    Interim History 2022  Kaia Smith is now 6 weeks out from injury.  Since last visit on 2022, been doing very well overall.  Pain has been minimal and she has not needed any oral pain medication.  She discontinued use of a sling.  She has been actively using the left arm for range of motion activities including abduction, maintaining restrictions for forward flexion.  Has been avoiding heavy lifting with the left arm.  She notes that her AC joint is no longer tender to palpation.  Sleeping well without interruption.  Feeling close to back to her self.  Denies numbness, tingling, paresthesias, weakness, radicular shooting pain, chest pain, shortness of breath.  No other concerns or questions today.      Initial visit - 2022     PCP: No Ref-Primary, Physician    Kaia Smith is a 41 year old Right hand dominant female who is seen in consultation at the request of  Mary Henriquez PA-C presenting with left collarbone fracture.     Ms. Smith presents with left shoulder pain secondary to a left sided clavicle fracture sustained on 22 after a fall tripping over her dog. She was seen in the ED on 9/3/22 where XR showed a closed nondisplaced fracture of the distal clavicle. She was given a sling and Rx for Naproxen and Norco.     Since the ED visit, pain has been using a sling for comfort. For the past few days, she has been weaning out of the sling. She notes trying to use her left arm more but keeping her arm by her side. She is limited in her ROM due to pain and stiffness. Denies numbness, tingling,  paresthesias, weakness, chest pain, SOB. Has been affecting her sleep. Pain is improved with sling use, ice and naproxen. No prior similar injuries.     Social History: currently employed as a nurse        Review of Systems  Musculoskeletal: as above  Remainder of review of systems is negative including constitutional, CV, pulmonary, GI, Skin and Neurologic except as noted in HPI or medical history.    Past Medical History:   Diagnosis Date     Acute mastitis of left breast 2014     H/O abnormal cervical Papanicolaou smear 1999     Menarche     13 y/o     Traumatic injury during pregnancy 2013     Past Surgical History:   Procedure Laterality Date     APPENDECTOMY      15 y/o      SECTION  3/19/2013    Procedure:  SECTION;;  Surgeon: Maddie Stallworth MD;  [facility]: [facility]     COSMETIC MAMMOPLASTY AUGMENTATION BILATERAL Bilateral 2016    Silicone     CRYOCAUTERY OF CERVIX N/A age 18     HYSTEROSCOPY,DIAGNOSTIC  2008     REMV PILONIDAL LESION SIMPLE  10/20/2005     Family History   Problem Relation Age of Onset     Hypertension Mother      Cancer Mother         uterine     Cerebrovascular Disease Father      Cancer Father         lymphoma and bladder     Hypertension Father      Alzheimer Disease Maternal Grandmother      Cerebrovascular Disease Maternal Grandmother      Alzheimer Disease Paternal Grandfather      Diabetes No family hx of      Coronary Artery Disease No family hx of      Hyperlipidemia No family hx of      Breast Cancer No family hx of      Colon Cancer No family hx of      Prostate Cancer No family hx of      Anxiety Disorder No family hx of      Depression No family hx of      Thyroid Disease No family hx of      Genetic Disorder No family hx of        Objective  /76   Wt 61.1 kg (134 lb 12.8 oz)   BMI 22.14 kg/m        General: healthy, alert and in no distress      HEENT: no scleral icterus or conjunctival erythema     Skin: no suspicious lesions or  rash. No jaundice.     CV: regular rhythm by palpation, 2+ distal pulses, no pedal edema      Resp: normal respiratory effort without conversational dyspnea     Psych: normal mood and affect      Gait: nonantalgic, appropriate coordination and balance     Neuro: normal light touch sensory exam of the extremities including all peripheral nerve distributions.     MSK L Shoulder: No ecchymosis, swelling, deformity present over left anterior shoulder. NTTP over the AC joint, distal clavicle or elsewhere today. Full AROM of the neck, shoulder, elbow, wrist, and all digits without pain. Strength 5/5 for LUE including Sab, SIR, SER, EF, EE, WE, WF, FDI, ADM, FPL. -Kaplan, -Neers, -Scarf, -Empty Can, -Oden.       Radiology  I independently reviewed today's new imaging, including XR left clavicle, with the interpretation of a mildly displaced distal left clavicle fracture with modest healing response evidence by slight callus formation, no significant advancement in displacement.    Recent Results (from the past 744 hour(s))   XR Shoulder Left G/E 3 Views    Narrative    EXAM: XR SHOULDER LEFT G/E 3 VIEWS  LOCATION: [facility]  DATE/TIME: 09/03/2022, 11:35 AM    INDICATION: Left shoulder injury and pain.  COMPARISON: None.      Impression    IMPRESSION:   1.  Acute nondisplaced fracture of the left distal clavicle. No definitive fracture extension into the acromioclavicular joint space.  2.  Normal joint spacing and alignment.          Assessment:  1. Closed nondisplaced fracture of acromial end of left clavicle with routine healing, subsequent encounter        Plan:  Kaia Smith is a 41 year old female that presents with left shoulder pain secondary to a closed nondisplaced distal clavicle fracture, now 6 weeks from injury.  Clinically, she is doing very well with no pain and near-normal function of her left arm without pain or limitation. Discussed the nature of the condition and  treatment options and mutually agreed upon the following plan:    - Imaging: Radiographs today show evidence of an appropriate healing response based on this type of fracture, clinically doing very well, no need for additional imaging.  - Bracing: Okay to discontinue sling use altogether.  - Medications:  Continue NSAIDs PRN.   - Therapy: Has not started physical therapy yet, okay to start PT for ROM stretching, strengthening and stabilization of the anterior shoulder complex to advance from range of motion to strengthening exercises since we have evidence of radiographic healing.  - Modalities:  May use ice packs PRN.   - Activity: Encouraged to progress slowly from regular range of motion activities to strengthening exercises, let pain be the guide.  Okay to remain active and participate in regular activities as symptoms allow.   - Follow up as needed in the future if pain returns or worsens, or she is unable to progress.  Patient has clinic contact information for questions or concerns.       Pascual Powell DO  [facility] - [facility] Medicine  Department of Orthopedic Surgery         Again, thank you for allowing me to participate in the care of your patient.        Sincerely,        Pascual Powell DO

## 2022-11-11 ENCOUNTER — E-VISIT (OUTPATIENT)
Dept: URGENT CARE | Facility: URGENT CARE | Age: 42
End: 2022-11-11
Payer: COMMERCIAL

## 2022-11-11 DIAGNOSIS — N39.0 ACUTE UTI (URINARY TRACT INFECTION): Primary | ICD-10-CM

## 2022-11-11 PROCEDURE — 99421 OL DIG E/M SVC 5-10 MIN: CPT | Performed by: PHYSICIAN ASSISTANT

## 2022-11-11 RX ORDER — NITROFURANTOIN 25; 75 MG/1; MG/1
100 CAPSULE ORAL 2 TIMES DAILY
Qty: 10 CAPSULE | Refills: 0 | Status: SHIPPED | OUTPATIENT
Start: 2022-11-11 | End: 2022-11-16

## 2022-11-11 NOTE — PATIENT INSTRUCTIONS
Dear Kaia Smith    After reviewing your responses, I've been able to diagnose you with a urinary tract infection, which is a common infection of the bladder with bacteria.  This is not a sexually transmitted infection, though urinating immediately after intercourse can help prevent infections.  Drinking lots of fluids is also helpful to clear your current infection and prevent the next one.      I have sent a prescription for antibiotics to your pharmacy to treat this infection.    It is important that you take all of your prescribed medication even if your symptoms are improving after a few doses.  Taking all of your medicine helps prevent the symptoms from returning.     If your symptoms worsen, you develop pain in your back or stomach, develop fevers, or are not improving in 5 days, please contact your primary care provider for an appointment or visit any of our convenient Walk-in or Urgent Care Centers to be seen, which can be found on our website here.    Thanks again for choosing us as your health care partner,    Mathieu Gupta, [facility], PA-C    Urinary Tract Infections in Women  Urinary tract infections (UTIs) are most often caused by bacteria. These bacteria enter the urinary tract. The bacteria may come from inside the body. Or they may travel from the skin outside the rectum or vagina into the urethra. Female anatomy makes it easy for bacteria from the bowel to enter a woman s urinary tract, which is the most common source of UTI. This means women develop UTIs more often than men. Pain in or around the urinary tract is a common UTI symptom. But the only way to know for sure if you have a UTI for the healthcare provider to test your urine. The two tests that may be done are the urinalysis and urine culture.     Types of UTIs    Cystitis. A bladder infection (cystitis) is the most common UTI in women. You may have urgent or frequent need to pee. You may also have pain, burning when you pee, and bloody  urine.    Urethritis. This is an inflamed urethra, which is the tube that carries urine from the bladder to outside the body. You may have lower stomach or back pain. You may also have urgent or frequent need to pee.    Pyelonephritis. This is a kidney infection. If not treated, it can be serious and damage your kidneys. In severe cases, you may need to stay in the hospital. You may have a fever and lower back pain.    Medicines to treat a UTI  Most UTIs are treated with antibiotics. These kill the bacteria. The length of time you need to take them depends on the type of infection. It may be as short as 3 days. If you have repeated UTIs, you may need a low-dose antibiotic for several months. Take antibiotics exactly as directed. Don t stop taking them until all of the medicine is gone. If you stop taking the antibiotic too soon, the infection may not go away. You may also develop a resistance to the antibiotic. This can make it much harder to treat.   Lifestyle changes to treat and prevent UTIs   The lifestyle changes below will help get rid of your UTI. They may also help prevent future UTIs.     Drink plenty of fluids. This includes water, juice, or other caffeine-free drinks. Fluids help flush bacteria out of your body.    Empty your bladder. Always empty your bladder when you feel the urge to pee. And always pee before going to sleep. Urine that stays in your bladder can lead to infection. Try to pee before and after sex as well.    Practice good personal hygiene. Wipe yourself from front to back after using the toilet. This helps keep bacteria from getting into the urethra.    Use condoms during sex. These help prevent UTIs caused by sexually transmitted bacteria. Also don't use spermicides during sex. These can increase the risk for UTIs. Choose other forms of birth control instead. For women who tend to get UTIs after sex, a low-dose of a preventive antibiotic may be used. Be sure to discuss this option with  your healthcare provider.    Follow up with your healthcare provider as directed. He or she may test to make sure the infection has cleared. If needed, more treatment may be started.  Janes last reviewed this educational content on 7/1/2019 2000-2021 [employer]. All rights reserved. This information is not intended as a substitute for professional medical care. Always follow your healthcare professional's instructions.

## 2022-11-20 ENCOUNTER — HEALTH MAINTENANCE LETTER (OUTPATIENT)
Age: 42
End: 2022-11-20

## 2022-11-30 ENCOUNTER — OFFICE VISIT (OUTPATIENT)
Dept: URGENT CARE | Facility: URGENT CARE | Age: 42
End: 2022-11-30
Payer: COMMERCIAL

## 2022-11-30 VITALS
HEIGHT: 66 IN | SYSTOLIC BLOOD PRESSURE: 112 MMHG | DIASTOLIC BLOOD PRESSURE: 72 MMHG | OXYGEN SATURATION: 100 % | TEMPERATURE: 98.5 F | HEART RATE: 85 BPM | WEIGHT: 129.3 LBS | BODY MASS INDEX: 20.78 KG/M2

## 2022-11-30 DIAGNOSIS — B34.9 VIRAL SYNDROME: Primary | ICD-10-CM

## 2022-11-30 DIAGNOSIS — J02.9 SORE THROAT: ICD-10-CM

## 2022-11-30 DIAGNOSIS — R68.89 FLU-LIKE SYMPTOMS: ICD-10-CM

## 2022-11-30 LAB
DEPRECATED S PYO AG THROAT QL EIA: NEGATIVE
FLUAV AG SPEC QL IA: NEGATIVE
FLUBV AG SPEC QL IA: NEGATIVE
GROUP A STREP BY PCR: NOT DETECTED

## 2022-11-30 PROCEDURE — 87804 INFLUENZA ASSAY W/OPTIC: CPT | Performed by: PHYSICIAN ASSISTANT

## 2022-11-30 PROCEDURE — 87651 STREP A DNA AMP PROBE: CPT | Performed by: PHYSICIAN ASSISTANT

## 2022-11-30 PROCEDURE — 99213 OFFICE O/P EST LOW 20 MIN: CPT | Performed by: PHYSICIAN ASSISTANT

## 2022-11-30 ASSESSMENT — ENCOUNTER SYMPTOMS
EYE PAIN: 0
EYES NEGATIVE: 1
SHORTNESS OF BREATH: 0
EYE ITCHING: 0
MYALGIAS: 1
PALPITATIONS: 0
FEVER: 1
SORE THROAT: 1
NAUSEA: 0
DIARRHEA: 0
COUGH: 0
LIGHT-HEADEDNESS: 0
ENDOCRINE NEGATIVE: 1
RHINORRHEA: 0
VOMITING: 0
ALLERGIC/IMMUNOLOGIC NEGATIVE: 1
WOUND: 0
ABDOMINAL PAIN: 0
CARDIOVASCULAR NEGATIVE: 1
HEADACHES: 0
CHILLS: 0
FATIGUE: 1

## 2022-11-30 NOTE — PROGRESS NOTES
"Chief Complaint:     Chief Complaint   Patient presents with     Pharyngitis     Generalized Body Aches     Fever     Chills     Fatigue       Results for orders placed or performed in visit on 11/30/22   Streptococcus A Rapid Screen w/Reflex to PCR - Clinic Collect     Status: Normal    Specimen: Throat; Swab   Result Value Ref Range    Group A Strep antigen Negative Negative   Influenza A & B Antigen - Clinic Collect     Status: Normal    Specimen: Nose; Swab   Result Value Ref Range    Influenza A antigen Negative Negative    Influenza B antigen Negative Negative    Narrative    Test results must be correlated with clinical data. If necessary, results should be confirmed by a molecular assay or viral culture.       Medical Decision Making:    Vital signs reviewed by Deepak Castro PA-C  /72 (BP Location: Left arm, Patient Position: Sitting, Cuff Size: Adult Regular)   Pulse 85   Temp 98.5  F (36.9  C) (Oral)   Ht 1.676 m (5' 6\")   Wt 58.7 kg (129 lb 4.8 oz)   SpO2 100%   BMI 20.87 kg/m      Differential Diagnosis:  URI Adult/Peds:  Influenza, Strep pharyngitis, Viral pharyngitis, Viral syndrome and Viral upper respiratory illness        ASSESSMENT    1. Viral syndrome    2. Sore throat    3. Flu-like symptoms        PLAN    Patient is in no acute distress.    Temp is 98.5 in clinic today, lung sounds were clear, and O2 sats at 100% on RA.    RST was negative.  We will call with PCR results only if positive.  Influenza was negative.  Rest, Push fluids, vaporizer, elevation of head of bed.  Ibuprofen and or Tylenol for any fever or body aches.  Over the counter cough suppressant- PRN- as discussed.   If symptoms worsen, recheck immediately otherwise follow up with your PCP in 1 week if symptoms are not improving.  Worrisome symptoms discussed with instructions to go to the ED.  Patient verbalized understanding and agreed with this plan.    Labs:    Results for orders placed or performed in visit on " "11/30/22   Streptococcus A Rapid Screen w/Reflex to PCR - Clinic Collect     Status: Normal    Specimen: Throat; Swab   Result Value Ref Range    Group A Strep antigen Negative Negative   Influenza A & B Antigen - Clinic Collect     Status: Normal    Specimen: Nose; Swab   Result Value Ref Range    Influenza A antigen Negative Negative    Influenza B antigen Negative Negative    Narrative    Test results must be correlated with clinical data. If necessary, results should be confirmed by a molecular assay or viral culture.        Vital signs reviewed by Deepak Castro PA-C  /72 (BP Location: Left arm, Patient Position: Sitting, Cuff Size: Adult Regular)   Pulse 85   Temp 98.5  F (36.9  C) (Oral)   Ht 1.676 m (5' 6\")   Wt 58.7 kg (129 lb 4.8 oz)   SpO2 100%   BMI 20.87 kg/m      Current Meds    No current outpatient medications on file.      Respiratory History    occasional episodes of bronchitis      SUBJECTIVE    HPI: Kaia Smith is an 42 year old female who presents with body aches, fatigue, fever and sore throat.  Symptoms began 1  days ago and has unchanged. Patient stated fever was at 99.8. Patient has been taking tylenol.  There is no shortness of breath, wheezing and chest pain.  Patient is eating and drinking well.  No fever, nausea, vomiting, or diarrhea.    Patient denies any recent travel or exposure to known COVID positive tested individual.      ROS:     Review of Systems   Constitutional: Positive for fatigue and fever. Negative for chills.   HENT: Positive for sore throat. Negative for congestion, ear pain and rhinorrhea.    Eyes: Negative.  Negative for pain and itching.   Respiratory: Negative for cough and shortness of breath.    Cardiovascular: Negative.  Negative for chest pain and palpitations.   Gastrointestinal: Negative for abdominal pain, diarrhea, nausea and vomiting.   Endocrine: Negative.    Genitourinary: Negative.    Musculoskeletal: Positive for myalgias.   Skin: " Negative.  Negative for rash and wound.   Allergic/Immunologic: Negative.  Negative for immunocompromised state.   Neurological: Negative for light-headedness and headaches.         Family History   Family History   Problem Relation Age of Onset     Hypertension Mother      Cancer Mother         uterine     Cerebrovascular Disease Father      Cancer Father         lymphoma and bladder     Hypertension Father      Alzheimer Disease Maternal Grandmother      Cerebrovascular Disease Maternal Grandmother      Alzheimer Disease Paternal Grandfather      Diabetes No family hx of      Coronary Artery Disease No family hx of      Hyperlipidemia No family hx of      Breast Cancer No family hx of      Colon Cancer No family hx of      Prostate Cancer No family hx of      Anxiety Disorder No family hx of      Depression No family hx of      Thyroid Disease No family hx of      Genetic Disorder No family hx of         Problem history  Patient Active Problem List   Diagnosis     CARDIOVASCULAR SCREENING; LDL GOAL LESS THAN 160     General counseling for prescription of oral contraceptives     H/O abnormal cervical Papanicolaou smear     History of dysplastic nevus        Allergies  No Known Allergies     Social History  Social History     Socioeconomic History     Marital status:      Spouse name: Not on file     Number of children: Not on file     Years of education: Not on file     Highest education level: Not on file   Occupational History     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   Other Topics Concern  "    Parent/sibling w/ CABG, MI or angioplasty before 65F 55M? Not Asked      Service No     Blood Transfusions No     Caffeine Concern No     Occupational Exposure No     Hobby Hazards No     Sleep Concern No     Stress Concern No     Weight Concern No     Special Diet No     Back Care No     Exercise No     Bike Helmet No     Seat Belt Yes     Self-Exams Yes   Social History Narrative    Patient works in the [employer] as an RN.    Two sons, now pregnant with third.      Social Determinants of Health     Financial Resource Strain: Not on file   Food Insecurity: Not on file   Transportation Needs: Not on file   Physical Activity: Not on file   Stress: Not on file   Social Connections: Not on file   Intimate Partner Violence: Not on file   Housing Stability: Not on file        OBJECTIVE     Vital signs reviewed by Deepak Castro PA-C  /72 (BP Location: Left arm, Patient Position: Sitting, Cuff Size: Adult Regular)   Pulse 85   Temp 98.5  F (36.9  C) (Oral)   Ht 1.676 m (5' 6\")   Wt 58.7 kg (129 lb 4.8 oz)   SpO2 100%   BMI 20.87 kg/m       Physical Exam  Vitals and nursing note reviewed.   Constitutional:       General: She is not in acute distress.     Appearance: She is well-developed. She is not ill-appearing, toxic-appearing or diaphoretic.   HENT:      Head: Normocephalic and atraumatic.      Right Ear: Hearing, tympanic membrane, ear canal and external ear normal. Tympanic membrane is not perforated, erythematous, retracted or bulging.      Left Ear: Hearing, tympanic membrane, ear canal and external ear normal. Tympanic membrane is not perforated, erythematous, retracted or bulging.      Nose: No mucosal edema, congestion or rhinorrhea.      Right Sinus: No maxillary sinus tenderness or frontal sinus tenderness.      Left Sinus: No maxillary sinus tenderness or frontal sinus tenderness.      Mouth/Throat:      Pharynx: Posterior oropharyngeal erythema present. No pharyngeal " swelling, oropharyngeal exudate or uvula swelling.      Tonsils: No tonsillar exudate or tonsillar abscesses. 0 on the right. 0 on the left.   Eyes:      General:         Right eye: No discharge.         Left eye: No discharge.      Pupils: Pupils are equal, round, and reactive to light.   Cardiovascular:      Rate and Rhythm: Normal rate and regular rhythm.      Heart sounds: Normal heart sounds. No murmur heard.    No friction rub. No gallop.   Pulmonary:      Effort: Pulmonary effort is normal. No respiratory distress.      Breath sounds: Normal breath sounds. No decreased breath sounds, wheezing, rhonchi or rales.   Chest:      Chest wall: No tenderness.   Musculoskeletal:      Cervical back: Normal range of motion and neck supple.   Lymphadenopathy:      Head:      Right side of head: No submental, submandibular, tonsillar, preauricular or posterior auricular adenopathy.      Left side of head: No submental, submandibular, tonsillar, preauricular or posterior auricular adenopathy.      Cervical: No cervical adenopathy.      Right cervical: No superficial or posterior cervical adenopathy.     Left cervical: No superficial or posterior cervical adenopathy.   Skin:     General: Skin is warm and dry.      Findings: No rash.   Neurological:      Mental Status: She is alert and oriented to person, place, and time.      Deep Tendon Reflexes: Reflexes are normal and symmetric.   Psychiatric:         Behavior: Behavior normal. Behavior is cooperative.         Thought Content: Thought content normal.         Judgment: Judgment normal.           Deepak Castro PA-C  11/30/2022, 1:23 PM

## 2022-12-02 ENCOUNTER — OFFICE VISIT (OUTPATIENT)
Dept: URGENT CARE | Facility: URGENT CARE | Age: 42
End: 2022-12-02
Payer: COMMERCIAL

## 2022-12-02 VITALS
WEIGHT: 127.7 LBS | DIASTOLIC BLOOD PRESSURE: 81 MMHG | HEIGHT: 66 IN | SYSTOLIC BLOOD PRESSURE: 125 MMHG | HEART RATE: 89 BPM | OXYGEN SATURATION: 98 % | TEMPERATURE: 97.7 F | BODY MASS INDEX: 20.52 KG/M2

## 2022-12-02 DIAGNOSIS — J02.9 SORE THROAT: Primary | ICD-10-CM

## 2022-12-02 DIAGNOSIS — Z20.822 SUSPECTED 2019 NOVEL CORONAVIRUS INFECTION: ICD-10-CM

## 2022-12-02 LAB
DEPRECATED S PYO AG THROAT QL EIA: NEGATIVE
GROUP A STREP BY PCR: NOT DETECTED

## 2022-12-02 PROCEDURE — U0003 INFECTIOUS AGENT DETECTION BY NUCLEIC ACID (DNA OR RNA); SEVERE ACUTE RESPIRATORY SYNDROME CORONAVIRUS 2 (SARS-COV-2) (CORONAVIRUS DISEASE [COVID-19]), AMPLIFIED PROBE TECHNIQUE, MAKING USE OF HIGH THROUGHPUT TECHNOLOGIES AS DESCRIBED BY CMS-2020-01-R: HCPCS | Performed by: PHYSICIAN ASSISTANT

## 2022-12-02 PROCEDURE — U0005 INFEC AGEN DETEC AMPLI PROBE: HCPCS | Performed by: PHYSICIAN ASSISTANT

## 2022-12-02 PROCEDURE — 87651 STREP A DNA AMP PROBE: CPT | Performed by: PHYSICIAN ASSISTANT

## 2022-12-02 PROCEDURE — 99214 OFFICE O/P EST MOD 30 MIN: CPT | Mod: CS | Performed by: PHYSICIAN ASSISTANT

## 2022-12-02 RX ORDER — DEXAMETHASONE SODIUM PHOSPHATE 4 MG/ML
10 VIAL (ML) INJECTION ONCE
Status: COMPLETED | OUTPATIENT
Start: 2022-12-02 | End: 2022-12-02

## 2022-12-02 RX ORDER — AZITHROMYCIN 250 MG/1
TABLET, FILM COATED ORAL
Qty: 6 TABLET | Refills: 0 | Status: SHIPPED | OUTPATIENT
Start: 2022-12-02 | End: 2022-12-07

## 2022-12-02 RX ADMIN — Medication 10 MG: at 12:32

## 2022-12-02 ASSESSMENT — ENCOUNTER SYMPTOMS
NECK PAIN: 0
FEVER: 0
DIARRHEA: 0
NECK STIFFNESS: 0
RHINORRHEA: 0
ENDOCRINE NEGATIVE: 1
MUSCULOSKELETAL NEGATIVE: 1
SORE THROAT: 1
ARTHRALGIAS: 0
SHORTNESS OF BREATH: 0
PALPITATIONS: 0
VOMITING: 0
JOINT SWELLING: 0
NAUSEA: 0
DIZZINESS: 0
ALLERGIC/IMMUNOLOGIC NEGATIVE: 1
WEAKNESS: 0
BACK PAIN: 0
COUGH: 0
CHILLS: 0
LIGHT-HEADEDNESS: 0
RESPIRATORY NEGATIVE: 1
EYES NEGATIVE: 1
CARDIOVASCULAR NEGATIVE: 1
WOUND: 0
HEADACHES: 0
MYALGIAS: 0

## 2022-12-02 NOTE — PROGRESS NOTES
"Chief Complaint:     Chief Complaint   Patient presents with     Pharyngitis     Pt said that she was here 2 days for the same reason, severe sore throat.        Results for orders placed or performed in visit on 12/02/22   Streptococcus A Rapid Screen w/Reflex to PCR - Clinic Collect     Status: Normal    Specimen: Throat; Swab   Result Value Ref Range    Group A Strep antigen Negative Negative       Medical Decision Making:    Vital signs reviewed by Deepak Castro PA-C  /81 (BP Location: Left arm, Patient Position: Sitting, Cuff Size: Adult Regular)   Pulse 89   Temp 97.7  F (36.5  C) (Tympanic)   Ht 1.676 m (5' 6\")   Wt 57.9 kg (127 lb 11.2 oz)   SpO2 98%   BMI 20.61 kg/m      Differential Diagnosis:  URI Adult/Peds:  Strep pharyngitis, Tonsilitis, Viral pharyngitis, Viral syndrome and Viral upper respiratory illness        ASSESSMENT    1. Sore throat    2. Suspected 2019 novel coronavirus infection        PLAN    Patient is in no acute distress.    Temp is 97.7 in clinic today, lung sounds were clear, and O2 sats at 98% on RA.    RST was negative.  We will call with PCR results only if positive.  COVID swab collected in clinic today.  With symptoms, Rx for Zithromax per patient request.  Patient given 10 Mg Decadron PO in clinic today.    Rest, Push fluids, vaporizer.  Ibuprofen and or Tylenol for any fever or body aches.  If symptoms worsen, recheck immediately otherwise follow up with your PCP in 1 week if symptoms are not improving.  Worrisome symptoms discussed with instructions to go to the ED.  Patient verbalized understanding and agreed with this plan.    Labs:    Results for orders placed or performed in visit on 12/02/22   Streptococcus A Rapid Screen w/Reflex to PCR - Clinic Collect     Status: Normal    Specimen: Throat; Swab   Result Value Ref Range    Group A Strep antigen Negative Negative        Vital signs reviewed by Deepak Castro PA-C  /81 (BP Location: Left arm, " "Patient Position: Sitting, Cuff Size: Adult Regular)   Pulse 89   Temp 97.7  F (36.5  C) (Tympanic)   Ht 1.676 m (5' 6\")   Wt 57.9 kg (127 lb 11.2 oz)   SpO2 98%   BMI 20.61 kg/m      Current Meds      Current Outpatient Medications:      azithromycin (ZITHROMAX) 250 MG tablet, Take 2 tablets (500 mg) by mouth daily for 1 day, THEN 1 tablet (250 mg) daily for 4 days., Disp: 6 tablet, Rfl: 0  No current facility-administered medications for this visit.      Respiratory History    occasional episodes of bronchitis      SUBJECTIVE    HPI: Kaia Smith is an 42 year old female who presents with sore throat.  Symptoms began 3  days ago and has unchanged.  There is no shortness of breath, wheezing and chest pain.  Patient is eating and drinking well.  No fever, nausea, vomiting, or diarrhea.    Patient denies any recent travel or exposure to known COVID positive tested individual.      ROS:     Review of Systems   Constitutional: Negative for chills and fever.   HENT: Positive for sore throat. Negative for congestion, ear pain and rhinorrhea.    Eyes: Negative.    Respiratory: Negative.  Negative for cough and shortness of breath.    Cardiovascular: Negative.  Negative for chest pain and palpitations.   Gastrointestinal: Negative for diarrhea, nausea and vomiting.   Endocrine: Negative.    Genitourinary: Negative.    Musculoskeletal: Negative.  Negative for arthralgias, back pain, joint swelling, myalgias, neck pain and neck stiffness.   Skin: Negative.  Negative for rash and wound.   Allergic/Immunologic: Negative.  Negative for immunocompromised state.   Neurological: Negative for dizziness, weakness, light-headedness and headaches.         Family History   Family History   Problem Relation Age of Onset     Hypertension Mother      Cancer Mother         uterine     Cerebrovascular Disease Father      Cancer Father         lymphoma and bladder     Hypertension Father      Alzheimer Disease Maternal Grandmother  "     Cerebrovascular Disease Maternal Grandmother      Alzheimer Disease Paternal Grandfather      Diabetes No family hx of      Coronary Artery Disease No family hx of      Hyperlipidemia No family hx of      Breast Cancer No family hx of      Colon Cancer No family hx of      Prostate Cancer No family hx of      Anxiety Disorder No family hx of      Depression No family hx of      Thyroid Disease No family hx of      Genetic Disorder No family hx of         Problem history  Patient Active Problem List   Diagnosis     CARDIOVASCULAR SCREENING; LDL GOAL LESS THAN 160     General counseling for prescription of oral contraceptives     H/O abnormal cervical Papanicolaou smear     History of dysplastic nevus        Allergies  No Known Allergies     Social History  Social History     Socioeconomic History     Marital status:      Spouse name: Not on file     Number of children: Not on file     Years of education: Not on file     Highest education level: Not on file   Occupational History     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   Other Topics Concern     Parent/sibling w/ CABG, MI or angioplasty before 65F 55M? Not Asked      Service No     Blood Transfusions No     Caffeine Concern No     Occupational Exposure No     Hobby Hazards No     Sleep Concern No     Stress Concern No     Weight Concern No     Special Diet No     Back Care No     Exercise No     Bike Helmet No     Seat Belt Yes     Self-Exams Yes   Social History Narrative    Patient works in the [employer] as an RN.     "Two sons, now pregnant with third.      Social Determinants of Health     Financial Resource Strain: Not on file   Food Insecurity: Not on file   Transportation Needs: Not on file   Physical Activity: Not on file   Stress: Not on file   Social Connections: Not on file   Intimate Partner Violence: Not on file   Housing Stability: Not on file        OBJECTIVE     Vital signs reviewed by Deepak Castro PA-C  /81 (BP Location: Left arm, Patient Position: Sitting, Cuff Size: Adult Regular)   Pulse 89   Temp 97.7  F (36.5  C) (Tympanic)   Ht 1.676 m (5' 6\")   Wt 57.9 kg (127 lb 11.2 oz)   SpO2 98%   BMI 20.61 kg/m       Physical Exam  Vitals and nursing note reviewed.   Constitutional:       General: She is not in acute distress.     Appearance: She is well-developed. She is not ill-appearing, toxic-appearing or diaphoretic.   HENT:      Head: Normocephalic and atraumatic.      Right Ear: Hearing, tympanic membrane, ear canal and external ear normal. Tympanic membrane is not perforated, erythematous, retracted or bulging.      Left Ear: Hearing, tympanic membrane, ear canal and external ear normal. Tympanic membrane is not perforated, erythematous, retracted or bulging.      Nose: Congestion present. No mucosal edema or rhinorrhea.      Right Sinus: No maxillary sinus tenderness or frontal sinus tenderness.      Left Sinus: No maxillary sinus tenderness or frontal sinus tenderness.      Mouth/Throat:      Pharynx: Posterior oropharyngeal erythema present. No pharyngeal swelling, oropharyngeal exudate or uvula swelling.      Tonsils: No tonsillar exudate or tonsillar abscesses. 0 on the right. 0 on the left.   Eyes:      General:         Right eye: No discharge.         Left eye: No discharge.      Pupils: Pupils are equal, round, and reactive to light.   Cardiovascular:      Rate and Rhythm: Normal rate and regular rhythm.      Heart sounds: Normal heart sounds. No murmur heard.    No friction rub. No " gallop.   Pulmonary:      Effort: Pulmonary effort is normal. No respiratory distress.      Breath sounds: Normal breath sounds. No decreased breath sounds, wheezing, rhonchi or rales.   Chest:      Chest wall: No tenderness.   Abdominal:      General: Bowel sounds are normal. There is no distension.      Palpations: Abdomen is soft. There is no mass.      Tenderness: There is no abdominal tenderness. There is no guarding.   Musculoskeletal:      Cervical back: Normal range of motion and neck supple.   Lymphadenopathy:      Head:      Right side of head: No submental, submandibular, tonsillar, preauricular or posterior auricular adenopathy.      Left side of head: No submental, submandibular, tonsillar, preauricular or posterior auricular adenopathy.      Cervical: No cervical adenopathy.      Right cervical: No superficial or posterior cervical adenopathy.     Left cervical: No superficial or posterior cervical adenopathy.   Skin:     General: Skin is warm and dry.      Findings: No rash.   Neurological:      Mental Status: She is alert and oriented to person, place, and time.      Cranial Nerves: No cranial nerve deficit.      Deep Tendon Reflexes: Reflexes are normal and symmetric.   Psychiatric:         Behavior: Behavior normal. Behavior is cooperative.         Thought Content: Thought content normal.         Judgment: Judgment normal.           Deepak Castro PA-C  12/2/2022, 12:14 PM

## 2022-12-03 LAB — SARS-COV-2 RNA RESP QL NAA+PROBE: POSITIVE

## 2023-02-23 ENCOUNTER — OFFICE VISIT (OUTPATIENT)
Dept: DERMATOLOGY | Facility: CLINIC | Age: 43
End: 2023-02-23
Payer: COMMERCIAL

## 2023-02-23 DIAGNOSIS — L81.4 SOLAR LENTIGINOSIS: ICD-10-CM

## 2023-02-23 DIAGNOSIS — Z86.018 HISTORY OF DYSPLASTIC NEVUS: ICD-10-CM

## 2023-02-23 DIAGNOSIS — L82.1 SEBORRHEIC KERATOSES: Primary | ICD-10-CM

## 2023-02-23 DIAGNOSIS — D22.9 MULTIPLE BENIGN NEVI: ICD-10-CM

## 2023-02-23 PROCEDURE — 99213 OFFICE O/P EST LOW 20 MIN: CPT | Performed by: DERMATOLOGY

## 2023-02-23 ASSESSMENT — PAIN SCALES - GENERAL: PAINLEVEL: NO PAIN (0)

## 2023-02-23 NOTE — PROGRESS NOTES
[facility] Dermatology Note  Encounter Date: Feb 23, 2023  Office Visit     Dermatology Problem List:  Last skin check 2/23/23, recommended yearly  1. 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  - s/p biopsy 9/27/2016, s/p shave bx 5/7/19  2. 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   ____________________________________________     Assessment & Plan:    # History of dysplastic nevi, no clinical evidence of recurrence.  - ABCDEs: Counseled ABCDEs of melanoma: Asymmetry, Border (irregularity), Color (not uniform, changes in color), Diameter (greater than 6 mm which is about the size of a pencil eraser), and Evolving (any changes in preexisting moles).  - Sun protection: Counseled SPF30+ sunscreen, UPF clothing, sun avoidance, tanning bed avoidance.  - Recommended regular skin checks.     # Seborrheic keratosis, non irritated on the trunk and extremities. Explained to patient benign nature of lesion. No treatment is necessary at this time unless the lesion changes or becomes symptomatic.     - Monitor for changes.     # Multiple clinically benign nevi on the trunk and extremities. No treatment is necessary at this time unless the lesion changes or becomes symptomatic.      # Solar lentigines on the sun exposed skin areas. Benign nature was discussed. No further intervention required at this time.       Procedures Performed:   None.    Follow-up: 14 month(s) in-person for a skin check, or earlier for new or changing lesions    Staff and Scribe:     Scribe Disclosure:   ITavon, am serving as a scribe to document services personally performed by this physician, Dr. Ellen Okeefe, based on data collection and the provider's statements to me.       Provider  Disclosure:   The documentation recorded by the scribe accurately reflects the services I personally performed and the decisions made by me.    Ellen Okeefe MD    Department of Dermatology  [facility]: Phone: [phone], Fax:[phone]  [facility]: Phone: [phone], Fax: [phone]    ____________________________________________    CC: Skin Check (FBSC- left side of cheek near chin and central chest under breast. )    HPI:  Ms. Kaia Smith is a(n) 42 year old female who presents today as a return patient for a skin check.    Last seen 2/23/21 for a spot check. At that time, 2 biopsies were obtained.     Today, she is concerned about a spot on the left cheek that has developed in the last year or so.  She also reports a spot on the central chest that has also developed recently, but not symptomatic.    Patient is otherwise feeling well, without additional skin concerns.    Labs Reviewed:  N/A    Physical Exam:  Vitals: There were no vitals taken for this visit.  SKIN: Total skin excluding the undergarment areas was performed. The exam included the head/face, neck, both arms, chest, back, abdomen, both legs, digits and/or nails. Declines further exam  - Well healed scars at sites of previous DN, no repigmentation or nodularity noted.   - There are waxy stuck on tan to brown papules on the trunk and extremities.     - Multiple regular brown pigmented macules and papules are identified on the trunk and extremities.   - Scattered brown macules on sun exposed areas.    - No other lesions of concern on areas examined.     Medications:  No current outpatient medications on file.     No current facility-administered medications for this visit.      Past Medical History:   Patient Active Problem List   Diagnosis     CARDIOVASCULAR SCREENING; LDL GOAL LESS THAN 160     General counseling  for prescription of oral contraceptives     H/O abnormal cervical Papanicolaou smear     History of dysplastic nevus     Past Medical History:   Diagnosis Date     Acute mastitis of left breast 2/7/2014     H/O abnormal cervical Papanicolaou smear 01/01/1999     Menarche     13 y/o     Traumatic injury during pregnancy 2/12/2013        CC No referring provider defined for this encounter. on close of this encounter.

## 2023-02-23 NOTE — PATIENT INSTRUCTIONS
Thank you for coming in during the snow!    Ellen Okeefe MD    Department of Dermatology  [facility]: Phone: [phone], Fax:[phone]  [facility]: Phone: [phone], Fax: [phone]    Patient Education     Checking for Skin Cancer  You can find cancer early by checking your skin each month. There are 3 kinds of skin cancer. They are melanoma, basal cell carcinoma, and squamous cell carcinoma. Doing monthly skin checks is the best way to find new marks or skin changes. Follow the instructions below for checking your skin.   The ABCDEs of checking moles for melanoma   Check your moles or growths for signs of melanoma using ABCDE:   Asymmetry: the sides of the mole or growth don t match  Border: the edges are ragged, notched, or blurred  Color: the color within the mole or growth varies  Diameter: the mole or growth is larger than 6 mm (size of a pencil eraser)  Evolving: the size, shape, or color of the mole or growth is changing (evolving is not shown in the images below)    Checking for other types of skin cancer  Basal cell carcinoma or squamous cell carcinoma have symptoms such as:     A spot or mole that looks different from all other marks on your skin  Changes in how an area feels, such as itching, tenderness, or pain  Changes in the skin's surface, such as oozing, bleeding, or scaliness  A sore that does not heal  New swelling or redness beyond the border of a mole    Who s at risk?  Anyone can get skin cancer. But you are at greater risk if you have:   Fair skin, light-colored hair, or light-colored eyes  Many moles or abnormal moles on your skin  A history of sunburns from sunlight or tanning beds  A family history of skin cancer  A history of exposure to radiation or chemicals  A weakened immune system  If you have had skin cancer in the past, you are at risk for  recurring skin cancer.   How to check your skin  Do your monthly skin checkups in front of a full-length mirror. Check all parts of your body, including your:   Head (ears, face, neck, and scalp)  Torso (front, back, and sides)  Arms (tops, undersides, upper, and lower armpits)  Hands (palms, backs, and fingers, including under the nails)  Buttocks and genitals  Legs (front, back, and sides)  Feet (tops, soles, toes, including under the nails, and between toes)  If you have a lot of moles, take digital photos of them each month. Make sure to take photos both up close and from a distance. These can help you see if any moles change over time.   Most skin changes are not cancer. But if you see any changes in your skin, call your doctor right away. Only he or she can diagnose a problem. If you have skin cancer, seeing your doctor can be the first step toward getting the treatment that could save your life.   [employer] last reviewed this educational content on 4/1/2019 2000-2020 [employer]. [address] [place] [address]. All rights reserved. This information is not intended as a substitute for professional medical care. Always follow your healthcare professional's instructions.       When should I call my doctor?  If you are worsening or not improving, please, contact us or seek urgent care as noted below.     Who should I call with questions (adults)?  [facility] (adult and pediatric): [phone]  [facility] (adult): [phone]  For urgent needs outside of business hours call the [facility] at [phone] and ask for the dermatology resident on call to be paged  If this is a medical emergency and you are unable to reach an ER, Call [phone]    Who should I call with questions (pediatric)?  [facility]- Pediatric Dermatology  Dr. Salima Dubose, Dr. Maida Alva, Dr. Chrystal Jolly, Ashlee  SHAWN Gibbs, Dr. Lulu Montgomery, Dr. Stacey Gomez & Dr. Keron Whiting  Non-urgent nurse triage line; [phone]- Tea and Yazmin OCHOA Care Coordinatordarlene Christianson (/Complex ) [phone]    If you need a prescription refill, please contact your pharmacy. Refills are approved or denied by our Physicians during normal business hours, Monday through Fridays  Per office policy, refills will not be granted if you have not been seen within the past year (or sooner depending on your child's condition)    Scheduling Information:  Pediatric Appointment Scheduling and Call Center [phone]  Radiology Scheduling- [phone]  Sedation Unit Scheduling- [phone]  [place] Scheduling- [facility] [phone]; Pediatric Dermatology [phone]  Main  Services: [phone]  Guatemalan: [phone]  German: [phone]  Hmong/Armenian/Emirati: [phone]  Preadmission Nursing Department Fax Number: [phone] (Fax all pre-operative paperwork to this number)    For urgent matters arising during evenings, weekends, or holidays that cannot wait for normal business hours please call [phone] and ask for the dermatology resident on call to be paged.

## 2023-02-23 NOTE — LETTER
2/23/2023         RE: Kaia Smith  [address] [place] [place] [address]        Dear Colleague,    Thank you for referring your patient, Kaia Smith, to the [facility]. Please see a copy of my visit note below.    [facility] Dermatology Note  Encounter Date: Feb 23, 2023  Office Visit     Dermatology Problem List:  Last skin check 2/23/23, recommended yearly  1. 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  - s/p biopsy 9/27/2016, s/p shave bx 5/7/19  2. 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   ____________________________________________     Assessment & Plan:    # History of dysplastic nevi, no clinical evidence of recurrence.  - ABCDEs: Counseled ABCDEs of melanoma: Asymmetry, Border (irregularity), Color (not uniform, changes in color), Diameter (greater than 6 mm which is about the size of a pencil eraser), and Evolving (any changes in preexisting moles).  - Sun protection: Counseled SPF30+ sunscreen, UPF clothing, sun avoidance, tanning bed avoidance.  - Recommended regular skin checks.     # Seborrheic keratosis, non irritated on the trunk and extremities. Explained to patient benign nature of lesion. No treatment is necessary at this time unless the lesion changes or becomes symptomatic.     - Monitor for changes.     # Multiple clinically benign nevi on the trunk and extremities. No treatment is necessary at this time unless the lesion changes or becomes symptomatic.      # Solar lentigines on the sun exposed skin areas. Benign nature was discussed. No further intervention required at this time.       Procedures Performed:   None.    Follow-up: 14 month(s) in-person for a skin check, or earlier for new or changing  lesions    Staff and Scribe:     Scribe Disclosure:   I, Tavon Lopez, am serving as a scribe to document services personally performed by this physician, Dr. Ellen Okeefe, based on data collection and the provider's statements to me.       Provider Disclosure:   The documentation recorded by the scribe accurately reflects the services I personally performed and the decisions made by me.    Ellen Okeefe MD    Department of Dermatology  [facility]: Phone: [phone], Fax:[phone]  [facility]: Phone: [phone], Fax: [phone]    ____________________________________________    CC: Skin Check (FBSC- left side of cheek near chin and central chest under breast. )    HPI:  Ms. Kaia Smith is a(n) 42 year old female who presents today as a return patient for a skin check.    Last seen 2/23/21 for a spot check. At that time, 2 biopsies were obtained.     Today, she is concerned about a spot on the left cheek that has developed in the last year or so.  She also reports a spot on the central chest that has also developed recently, but not symptomatic.    Patient is otherwise feeling well, without additional skin concerns.    Labs Reviewed:  N/A    Physical Exam:  Vitals: There were no vitals taken for this visit.  SKIN: Total skin excluding the undergarment areas was performed. The exam included the head/face, neck, both arms, chest, back, abdomen, both legs, digits and/or nails. Declines further exam  - Well healed scars at sites of previous DN, no repigmentation or nodularity noted.   - There are waxy stuck on tan to brown papules on the trunk and extremities.     - Multiple regular brown pigmented macules and papules are identified on the trunk and extremities.   - Scattered brown macules on sun exposed areas.    - No other lesions of concern on areas examined.     Medications:  No  current outpatient medications on file.     No current facility-administered medications for this visit.      Past Medical History:   Patient Active Problem List   Diagnosis     CARDIOVASCULAR SCREENING; LDL GOAL LESS THAN 160     General counseling for prescription of oral contraceptives     H/O abnormal cervical Papanicolaou smear     History of dysplastic nevus     Past Medical History:   Diagnosis Date     Acute mastitis of left breast 2/7/2014     H/O abnormal cervical Papanicolaou smear 01/01/1999     Menarche     13 y/o     Traumatic injury during pregnancy 2/12/2013        CC No referring provider defined for this encounter. on close of this encounter.     Kaia Smith's chief complaint for this visit includes:  Chief Complaint   Patient presents with     Skin Check     FBSC- left side of cheek near chin and central chest under breast.      PCP: No Ref-Primary, Physician    Referring Provider:  No referring provider defined for this encounter.    There were no vitals taken for this visit.  No Pain (0)      No Known Allergies      Do you need any medication refills at today's visit?  No.       Zahra Urbina LPN      Again, thank you for allowing me to participate in the care of your patient.        Sincerely,        Ellen Okeefe MD

## 2023-02-23 NOTE — PROGRESS NOTES
Kaia Smith's chief complaint for this visit includes:  Chief Complaint   Patient presents with     Skin Check     FBSC- left side of cheek near chin and central chest under breast.      PCP: No Ref-Primary, Physician    Referring Provider:  No referring provider defined for this encounter.    There were no vitals taken for this visit.  No Pain (0)      No Known Allergies      Do you need any medication refills at today's visit?  No.       Zahra Urbina LPN

## 2023-10-16 ENCOUNTER — ANCILLARY PROCEDURE (OUTPATIENT)
Dept: MAMMOGRAPHY | Facility: CLINIC | Age: 43
End: 2023-10-16
Payer: COMMERCIAL

## 2023-10-16 DIAGNOSIS — Z12.31 VISIT FOR SCREENING MAMMOGRAM: ICD-10-CM

## 2023-10-16 PROCEDURE — 77067 SCR MAMMO BI INCL CAD: CPT | Mod: GC | Performed by: RADIOLOGY

## 2023-10-16 PROCEDURE — 77063 BREAST TOMOSYNTHESIS BI: CPT | Mod: GC | Performed by: RADIOLOGY

## 2023-11-25 ENCOUNTER — HEALTH MAINTENANCE LETTER (OUTPATIENT)
Age: 43
End: 2023-11-25

## 2024-01-08 ENCOUNTER — NURSE TRIAGE (OUTPATIENT)
Dept: FAMILY MEDICINE | Facility: CLINIC | Age: 44
End: 2024-01-08
Payer: COMMERCIAL

## 2024-01-08 NOTE — TELEPHONE ENCOUNTER
RN called patient on 1/8/2024 and LVM to call clinic at [phone].     If patient calls back please follow triage workflow regarding back pain/kidney.    GABRIELA Adkins  [facility]

## 2024-01-08 NOTE — TELEPHONE ENCOUNTER
Patient called back and was triaged.  Pain is mild and now constant.  Patient denies urinary symptoms, fever, or pain radiating anywhere.  Also denies abdominal pain or vomiting.  Has been ongoing for a week.  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            Needs to be seen in clinic within three days.  Got patient an appointment tomorrow with same day provider.  Patient states she will go in if symptoms worsen over night.      Reason for Disposition   MILD pain (i.e., scale 1-3; does not interfere with normal activities) and present > 3 days    Additional Information   Negative: Passed out (i.e., lost consciousness, collapsed and was not responding)   Negative: Shock suspected (e.g., cold/pale/clammy skin, too weak to stand, low BP, rapid pulse)   Negative: Sounds like a life-threatening emergency to the triager   Negative: Followed a major injury to the back (e.g., MVA, fall > 10 feet or 3 meters, penetrating injury, etc.)   Negative: Upper, mid or lower back pain that occurs mainly in the midline   Negative: SEVERE pain (e.g., excruciating, scale 8-10) and present > 1 hour   Negative: Sudden onset of severe flank pain and age > 60 years   Negative: Abdominal pain and age > 60 years   Negative: Unable to urinate (or only a few drops) > 4 hours and bladder feels very full (e.g., palpable bladder or strong urge to urinate)   Negative: Pain radiates into groin, scrotum   Negative: Blood in urine (red, pink, or tea-colored)   Negative: Vomiting   Negative: Weakness of a leg or foot (e.g., unable to bear weight, dragging foot)   Negative: Patient sounds very sick or weak to the triager   Negative: Fever > 100.4 F (38.0 C)   Negative: Pain or burning with passing urine (urination)   Negative: Patient wants to be seen   Negative: MODERATE pain (e.g., interferes with normal activities or awakens from sleep)   Negative: Painful  "rash with multiple small blisters grouped together (i.e., dermatomal distribution or 'band' or 'stripe')   Negative: Pregnant  (Exception: Mild pain that is only present with movement.)   Negative: Diabetes mellitus or weak immune system (e.g., HIV positive, cancer chemo, splenectomy, organ transplant, chronic steroids)  (Exception: Mild pain that is only present with movement.)    Answer Assessment - Initial Assessment Questions  1. LOCATION: \"Where does it hurt?\" (e.g., left, right)      Back/flank pain, right side  2. ONSET: \"When did the pain start?\"      Ongoing, slowly building over last week  3. SEVERITY: \"How bad is the pain?\" (e.g., Scale 1-10; mild, moderate, or severe)    - MILD (1-3): doesn't interfere with normal activities     - MODERATE (4-7): interferes with normal activities or awakens from sleep     - SEVERE (8-10): excruciating pain and patient unable to do normal activities (stays in bed)        Mild  4. PATTERN: \"Does the pain come and go, or is it constant?\"       Constant  5. CAUSE: \"What do you think is causing the pain?\"      Kidney possibility  6. OTHER SYMPTOMS:  \"Do you have any other symptoms?\" (e.g., fever, abdomen pain, vomiting, leg weakness, burning with urination, blood in urine)      None  7. PREGNANCY:  \"Is there any chance you are pregnant?\" \"When was your last menstrual period?\"      No    Protocols used: Flank Pain-A-[place]    "

## 2024-01-08 NOTE — TELEPHONE ENCOUNTER
Reason for Call:  Appointment Request    Patient requesting this type of appt:  Back pain/Kidney    Requested provider:  Any available provider    Reason patient unable to be scheduled: Not within requested timeframe    When does patient want to be seen/preferred time: 1-2 days    Comments: Pt is looking for an appt for back/kidney pain - declined nurse triage    Could we send this information to you in [facility] or would you prefer to receive a phone call?:   Patient would prefer a phone call   Okay to leave a detailed message?: Yes at Cell number on file:    Telephone Information:   Mobile [phone]       Call taken on 1/8/2024 at 1:11 PM by KIERSTEN FITCH

## 2024-01-09 ENCOUNTER — OFFICE VISIT (OUTPATIENT)
Dept: FAMILY MEDICINE | Facility: CLINIC | Age: 44
End: 2024-01-09
Payer: COMMERCIAL

## 2024-01-09 VITALS
HEART RATE: 79 BPM | DIASTOLIC BLOOD PRESSURE: 81 MMHG | SYSTOLIC BLOOD PRESSURE: 114 MMHG | OXYGEN SATURATION: 98 % | TEMPERATURE: 97.2 F | BODY MASS INDEX: 20.7 KG/M2 | HEIGHT: 66 IN | WEIGHT: 128.8 LBS

## 2024-01-09 DIAGNOSIS — R31.29 MICROHEMATURIA: ICD-10-CM

## 2024-01-09 DIAGNOSIS — R10.9 RIGHT FLANK PAIN: Primary | ICD-10-CM

## 2024-01-09 LAB
ALBUMIN UR-MCNC: NEGATIVE MG/DL
APPEARANCE UR: CLEAR
BACTERIA #/AREA URNS HPF: ABNORMAL /HPF
BILIRUB UR QL STRIP: NEGATIVE
COLOR UR AUTO: YELLOW
GLUCOSE UR STRIP-MCNC: NEGATIVE MG/DL
HGB UR QL STRIP: ABNORMAL
KETONES UR STRIP-MCNC: 15 MG/DL
LEUKOCYTE ESTERASE UR QL STRIP: NEGATIVE
NITRATE UR QL: NEGATIVE
PH UR STRIP: 7.5 [PH] (ref 5–7)
RBC #/AREA URNS AUTO: ABNORMAL /HPF
SP GR UR STRIP: 1.02 (ref 1–1.03)
SQUAMOUS #/AREA URNS AUTO: ABNORMAL /LPF
UROBILINOGEN UR STRIP-ACNC: 0.2 E.U./DL
WBC #/AREA URNS AUTO: ABNORMAL /HPF

## 2024-01-09 PROCEDURE — 81001 URINALYSIS AUTO W/SCOPE: CPT | Performed by: PHYSICIAN ASSISTANT

## 2024-01-09 PROCEDURE — 82565 ASSAY OF CREATININE: CPT | Performed by: PHYSICIAN ASSISTANT

## 2024-01-09 PROCEDURE — 99214 OFFICE O/P EST MOD 30 MIN: CPT | Performed by: PHYSICIAN ASSISTANT

## 2024-01-09 PROCEDURE — 36415 COLL VENOUS BLD VENIPUNCTURE: CPT | Performed by: PHYSICIAN ASSISTANT

## 2024-01-09 ASSESSMENT — PAIN SCALES - GENERAL: PAINLEVEL: MILD PAIN (3)

## 2024-01-09 ASSESSMENT — ENCOUNTER SYMPTOMS: BACK PAIN: 1

## 2024-01-09 NOTE — PROGRESS NOTES
Assessment & Plan     Right flank pain  Pain has been persistent.  Has been worsening.  Fairly localized to CVA region.  Discussed muscular versus kidney related.  Do suspect muscular but with ongoing and worsening symptoms opted to check UA, patient would like creatinine as well.  UA showed trace amount of blood as well as increased pH and few ketones.  Blood may be contamination related as there were few squamous cells but with her associated symptoms we will proceed with CT to evaluate for any underlying stone disease or anatomical changes with kidney.  If negative, plan would be to monitor or physical therapy per patient preference.  - UA Macroscopic with reflex to Microscopic and Culture - Lab Collect; Future  - Creatinine; Future  - UA Macroscopic with reflex to Microscopic and Culture - Lab Collect  - Creatinine  - UA Microscopic with Reflex to Culture  - CT Abdomen Pelvis w/o Contrast; Future    Microhematuria  Few RBCs seen, question true microhematuria versus contamination.  - CT Abdomen Pelvis w/o Contrast; Future               Martha Fam PA-C  [facility]    Liudmila Marti is a 43 year old, presenting for the following health issues:  Back Pain      1/9/2024    10:37 AM   Additional Questions   Roomed by Jazz Morris       Back Pain     History of Present Illness       Back Pain:  She presents for follow up of back pain. Patient's back pain is a recurring problem.  Location of back pain:  Right middle of back and right upper back  Description of back pain: dull ache  Back pain spreads: nowhere    Since patient first noticed back pain, pain is: gradually worsening  Does back pain interfere with her job:  No       She eats 2-3 servings of fruits and vegetables daily.She consumes 0 sweetened beverage(s) daily.She exercises with enough effort to increase her heart rate 30 to 60 minutes per day.  She exercises with enough effort to increase her heart rate 6 days per  week.   She is taking medications regularly.       Patient presents today for right flank pain.  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  Has a lot of cancers in her family, not renal.  Initially pain was intermittent.  She thought it was likely muscular or could be related to an old bed.  Over the past 1 to 2 weeks has become constant.  Pain is not severe but is always present.  No radiation of pain.  Urine has looked a little darker but otherwise no aaron hematuria, pain, frequency.  No fever, malaise, nausea, vomiting, abdominal pain.          Review of Systems   Musculoskeletal:  Positive for back pain.            Objective    There were no vitals taken for this visit.  There is no height or weight on file to calculate BMI.  Physical Exam   GENERAL: healthy, alert and no distress  EYES: Eyes grossly normal to inspection, PERRL and conjunctivae and sclerae normal  RESP: lungs clear to auscultation - no rales, rhonchi or wheezes  CV: regular rate and rhythm, normal S1 S2, no S3 or S4, no murmur  ABDOMEN: soft, nontender, no hepatosplenomegaly, no masses and bowel sounds normal  MS: No midline spinal tenderness.  No spinal deformity.  Range of motion appropriate.  Gait normal.  Area of pain is right CVA extending to right paraspinal  SKIN: no suspicious lesions or rashes    Results for orders placed or performed in visit on 01/09/24 (from the past 24 hour(s))   UA Macroscopic with reflex to Microscopic and Culture - Lab Collect    Specimen: Urine, Midstream   Result Value Ref Range    Color Urine Yellow Colorless, Straw, Light Yellow, Yellow    Appearance Urine Clear Clear    Glucose Urine Negative Negative mg/dL    Bilirubin Urine Negative Negative    Ketones Urine 15 (A) Negative mg/dL    Specific Gravity Urine 1.020 1.003 - 1.035    Blood Urine Trace (A) Negative    pH Urine 7.5 (H) 5.0 - 7.0    Protein Albumin Urine Negative Negative mg/dL    Urobilinogen  Urine 0.2 0.2, 1.0 E.U./dL    Nitrite Urine Negative Negative    Leukocyte Esterase Urine Negative Negative   UA Microscopic with Reflex to Culture   Result Value Ref Range    Bacteria Urine Few (A) None Seen /HPF    RBC Urine 2-5 (A) 0-2 /HPF /HPF    WBC Urine 0-5 0-5 /HPF /HPF    Squamous Epithelials Urine Few (A) None Seen /LPF    Narrative    Urine Culture not indicated

## 2024-01-10 ENCOUNTER — MYC MEDICAL ADVICE (OUTPATIENT)
Dept: FAMILY MEDICINE | Facility: CLINIC | Age: 44
End: 2024-01-10

## 2024-01-10 ENCOUNTER — ANCILLARY PROCEDURE (OUTPATIENT)
Dept: CT IMAGING | Facility: CLINIC | Age: 44
End: 2024-01-10
Attending: PHYSICIAN ASSISTANT
Payer: COMMERCIAL

## 2024-01-10 DIAGNOSIS — R31.29 MICROHEMATURIA: ICD-10-CM

## 2024-01-10 DIAGNOSIS — R10.9 FLANK PAIN: Primary | ICD-10-CM

## 2024-01-10 DIAGNOSIS — R10.9 RIGHT FLANK PAIN: ICD-10-CM

## 2024-01-10 LAB
CREAT SERPL-MCNC: 0.65 MG/DL (ref 0.51–0.95)
EGFRCR SERPLBLD CKD-EPI 2021: >90 ML/MIN/1.73M2

## 2024-01-10 PROCEDURE — 74176 CT ABD & PELVIS W/O CONTRAST: CPT | Mod: GC | Performed by: RADIOLOGY

## 2024-01-11 ENCOUNTER — DOCUMENTATION ONLY (OUTPATIENT)
Dept: FAMILY MEDICINE | Facility: CLINIC | Age: 44
End: 2024-01-11
Payer: COMMERCIAL

## 2024-01-11 NOTE — PROGRESS NOTES
The add on UC is too old for add on testing. Lab has only 24 hours. It will be ordered for a future collection JI.

## 2024-03-18 ENCOUNTER — PATIENT OUTREACH (OUTPATIENT)
Dept: ONCOLOGY | Facility: CLINIC | Age: 44
End: 2024-03-18

## 2024-03-18 ENCOUNTER — OFFICE VISIT (OUTPATIENT)
Dept: FAMILY MEDICINE | Facility: CLINIC | Age: 44
End: 2024-03-18
Payer: COMMERCIAL

## 2024-03-18 VITALS
HEART RATE: 66 BPM | DIASTOLIC BLOOD PRESSURE: 82 MMHG | SYSTOLIC BLOOD PRESSURE: 116 MMHG | TEMPERATURE: 98 F | BODY MASS INDEX: 22.01 KG/M2 | WEIGHT: 132.1 LBS | HEIGHT: 65 IN | OXYGEN SATURATION: 100 % | RESPIRATION RATE: 14 BRPM

## 2024-03-18 DIAGNOSIS — Z11.59 NEED FOR HEPATITIS C SCREENING TEST: ICD-10-CM

## 2024-03-18 DIAGNOSIS — Z80.9 FAMILY HISTORY OF CANCER: ICD-10-CM

## 2024-03-18 DIAGNOSIS — N93.8 DUB (DYSFUNCTIONAL UTERINE BLEEDING): ICD-10-CM

## 2024-03-18 DIAGNOSIS — Z13.1 SCREENING FOR DIABETES MELLITUS (DM): ICD-10-CM

## 2024-03-18 DIAGNOSIS — Z13.29 SCREENING FOR THYROID DISORDER: ICD-10-CM

## 2024-03-18 DIAGNOSIS — Z12.4 SCREENING FOR MALIGNANT NEOPLASM OF CERVIX: ICD-10-CM

## 2024-03-18 DIAGNOSIS — Z13.0 SCREENING FOR DISORDER OF BLOOD AND BLOOD-FORMING ORGANS: ICD-10-CM

## 2024-03-18 DIAGNOSIS — Z00.00 ROUTINE GENERAL MEDICAL EXAMINATION AT A HEALTH CARE FACILITY: Primary | ICD-10-CM

## 2024-03-18 DIAGNOSIS — Z13.6 CARDIOVASCULAR SCREENING; LDL GOAL LESS THAN 160: ICD-10-CM

## 2024-03-18 PROCEDURE — 87624 HPV HI-RISK TYP POOLED RSLT: CPT | Performed by: NURSE PRACTITIONER

## 2024-03-18 PROCEDURE — 99396 PREV VISIT EST AGE 40-64: CPT | Performed by: NURSE PRACTITIONER

## 2024-03-18 PROCEDURE — G0145 SCR C/V CYTO,THINLAYER,RESCR: HCPCS | Performed by: NURSE PRACTITIONER

## 2024-03-18 SDOH — HEALTH STABILITY: PHYSICAL HEALTH: ON AVERAGE, HOW MANY DAYS PER WEEK DO YOU ENGAGE IN MODERATE TO STRENUOUS EXERCISE (LIKE A BRISK WALK)?: 5 DAYS

## 2024-03-18 ASSESSMENT — SOCIAL DETERMINANTS OF HEALTH (SDOH): HOW OFTEN DO YOU GET TOGETHER WITH FRIENDS OR RELATIVES?: TWICE A WEEK

## 2024-03-18 NOTE — PATIENT INSTRUCTIONS
PLAN:   1.   Symptomatic therapy suggested: Increase calcium to 1000mg and 1000 international unit(s) Vit D .  2.  Orders Placed This Encounter   Procedures    US Pelvic Transabdominal and Transvaginal    Lipid panel reflex to direct LDL Fasting    CBC with platelets    Comprehensive metabolic panel    TSH with free T4 reflex    Hepatitis C Screen Reflex to HCV RNA Quant and Genotype    Adult Oncology/Hematology  Referral    Pap screen with HPV - recommended age 30 - 65 years     3.  FUTURE LABS:       - Schedule a fasting blood draw   Schedule pelvic ultrasound   Will follow up and/or notify patient of  results via My Chart to determine further need for followup  Follow up office visit in one year for annual health maintenance exam, sooner PRN.     Preventive Care Advice   This is general advice given by our system to help you stay healthy. However, your care team may have specific advice just for you. Please talk to your care team about your preventive care needs.  Nutrition  Eat 5 or more servings of fruits and vegetables each day.  Try wheat bread, brown rice and whole grain pasta (instead of white bread, rice, and pasta).  Get enough calcium and vitamin D. Check the label on foods and aim for 100% of the RDA (recommended daily allowance).  Lifestyle  Exercise at least 150 minutes each week   (30 minutes a day, 5 days a week).  Do muscle strengthening activities 2 days a week. These help control your weight and prevent disease.  No smoking.  Wear sunscreen to prevent skin cancer.  Have a dental exam and cleaning every 6 months.  Yearly exams  See your health care team every year to talk about:  Any changes in your health.  Any medicines your care team has prescribed.  Preventive care, family planning, and ways to prevent chronic diseases.  Shots (vaccines)   HPV shots (up to age 26), if you've never had them before.  Hepatitis B shots (up to age 59), if you've never had them before.  COVID-19 shot: Get  this shot when it's due.  Flu shot: Get a flu shot every year.  Tetanus shot: Get a tetanus shot every 10 years.  Pneumococcal, hepatitis A, and RSV shots: Ask your care team if you need these based on your risk.  Shingles shot (for age 50 and up).  General health tests  Diabetes screening:  Starting at age 35, Get screened for diabetes at least every 3 years.  If you are younger than age 35, ask your care team if you should be screened for diabetes.  Cholesterol test: At age 39, start having a cholesterol test every 5 years, or more often if advised.  Bone density scan (DEXA): At age 50, ask your care team if you should have this scan for osteoporosis (brittle bones).  Hepatitis C: Get tested at least once in your life.  STIs (sexually transmitted infections)  Before age 24: Ask your care team if you should be screened for STIs.  After age 24: Get screened for STIs if you're at risk. You are at risk for STIs (including HIV) if:  You are sexually active with more than one person.  You don't use condoms every time.  You or a partner was diagnosed with a sexually transmitted infection.  If you are at risk for HIV, ask about PrEP medicine to prevent HIV.  Get tested for HIV at least once in your life, whether you are at risk for HIV or not.  Cancer screening tests  Cervical cancer screening: If you have a cervix, begin getting regular cervical cancer screening tests at age 21. Most people who have regular screenings with normal results can stop after age 65. Talk about this with your provider.  Breast cancer scan (mammogram): If you've ever had breasts, begin having regular mammograms starting at age 40. This is a scan to check for breast cancer.  Colon cancer screening: It is important to start screening for colon cancer at age 45.  Have a colonoscopy test every 10 years (or more often if you're at risk) Or, ask your provider about stool tests like a FIT test every year or Cologuard test every 3 years.  To learn more  about your testing options, visit: https://www.IMImobile/122993.pdf.  For help making a decision, visit: https://bit.ly/dp16995.  Prostate cancer screening test: If you have a prostate and are age 55 to 69, ask your provider if you would benefit from a yearly prostate cancer screening test.  Lung cancer screening: If you are a current or former smoker age 50 to 80, ask your care team if ongoing lung cancer screenings are right for you.  For informational purposes only. Not to replace the advice of your health care provider. Copyright   2023 [employer]. All rights reserved. Clinically reviewed by the [facility] Transitions Program. Breakmoon.[employer] [id number] - REV 01/24.

## 2024-03-18 NOTE — PROGRESS NOTES
Preventive Care Visit  [facility]  Raina THAO Castellon CNP, Internal Medicine - Pediatrics  Mar 18, 2024      Assessment & Plan     Routine general medical examination at a health care facility    CARDIOVASCULAR SCREENING; LDL GOAL LESS THAN 160  - Lipid panel reflex to direct LDL Fasting    Screening for diabetes mellitus (DM)  - Comprehensive metabolic panel    Screening for disorder of blood and blood-forming organs  - CBC with platelets    Screening for thyroid disorder  - TSH with free T4 reflex    Need for hepatitis C screening test  - Hepatitis C Screen Reflex to HCV RNA Quant and Genotype    Screening for malignant neoplasm of cervix  - Pap screen with HPV - recommended age 30 - 65 years  - HPV Hold (Lab Only)  - HPV High Risk Types DNA Cervical    DUB (dysfunctional uterine bleeding)  - US Pelvic Transabdominal and Transvaginal  - Ob/Gyn  Referral    Family history of cancer  - Adult Oncology/Hematology  Referral      Patient has been advised of split billing requirements and indicates understanding: Yes  Ordering of each unique test   Time spent by me doing chart review, history and exam, documentation and further activities per the note      Counseling  Appropriate preventive services were discussed with this patient, including applicable screening as appropriate for fall prevention, nutrition, physical activity, Tobacco-use cessation, weight loss and cognition.  Checklist reviewing preventive services available has been given to the patient.  Reviewed patient's diet, addressing concerns and/or questions.       FUTURE APPOINTMENTS:       - Follow-up for annual visit or as needed  See Patient Instructions      Liudmila Marti is a 43 year old, presenting for the following:  Physical        3/18/2024    12:43 PM   Additional Questions   Roomed by Laya        Via the Health Maintenance questionnaire, the patient has reported the following services have been  completed -Cervical Cancer Screening, this information has been sent to the abstraction team.  Health Care Directive  Patient does not have a Health Care Directive or Living Will: Discussed advance care planning with patient; information given to patient to review.    HPI      3/18/2024   General Health   How would you rate your overall physical health? (!) FAIR   Feel stress (tense, anxious, or unable to sleep) Not at all         3/18/2024   Nutrition   Three or more servings of calcium each day? Yes   Diet: Regular (no restrictions)   How many servings of fruit and vegetables per day? (!) 2-3   How many sweetened beverages each day? 0-1         3/18/2024   Exercise   Days per week of moderate/strenous exercise 5 days         3/18/2024   Social Factors   Frequency of gathering with friends or relatives Twice a week   Worry food won't last until get money to buy more No   Food not last or not have enough money for food? No   Do you have housing?  Yes   Are you worried about losing your housing? No   Lack of transportation? No   Unable to get utilities (heat,electricity)? No         3/18/2024   Dental   Dentist two times every year? Yes         3/18/2024   TB Screening   Were you born outside of the [place]? No           Today's PHQ-2 Score:       1/9/2024    10:35 AM   PHQ-2 ( 1999 [employer])   Q1: Little interest or pleasure in doing things 0   Q2: Feeling down, depressed or hopeless 0   PHQ-2 Score 0   Q1: Little interest or pleasure in doing things Not at all   Q2: Feeling down, depressed or hopeless Not at all   PHQ-2 Score 0         3/18/2024   Substance Use   Alcohol more than 3/day or more than 7/wk No   Do you use any other substances recreationally? No     Social History     Tobacco Use    Smoking status: Never    Smokeless tobacco: Never   Vaping Use    Vaping Use: Never used   Substance Use Topics    Alcohol use: Yes     Alcohol/week: 0.0 standard drinks of alcohol     Comment: occasionally    Drug use: No              3/18/2024   Breast Cancer Screening   Family history of breast, colon, or ovarian cancer? No / Unknown         10/16/2023   LAST FHS-7 RESULTS   1st degree relative breast or ovarian cancer No    No   Any relative bilateral breast cancer No    No   Any male have breast cancer No   Any ONE woman have BOTH breast AND ovarian cancer No   Any woman with breast cancer before 50yrs No   2 or more relatives with breast AND/OR ovarian cancer No   2 or more relatives with breast AND/OR bowel cancer No        Mammogram Screening - Mammogram every 1-2 years updated in Health Maintenance based on mutual decision making        3/18/2024   STI Screening   New sexual partner(s) since last STI/HIV test? No     History of abnormal Pap smear: NO - age 30-65 PAP every 5 years with negative HPV co-testing recommended        Latest Ref Rng & Units 2019    11:55 AM 2019    11:48 AM 2016    12:00 AM   PAP / HPV   PAP (Historical)   NIL  NIL    HPV 16 DNA NEG^Negative Negative      HPV 18 DNA NEG^Negative Negative      Other HR HPV NEG^Negative Negative        ASCVD Risk   The 10-year ASCVD risk score (Rubio VIVEROS, et al., 2019) is: 0.3%    Values used to calculate the score:      Age: 43 years      Sex: Female      Is Non- : No      Diabetic: No      Tobacco smoker: No      Systolic Blood Pressure: 116 mmHg      Is BP treated: No      HDL Cholesterol: 78 mg/dL      Total Cholesterol: 191 mg/dL        3/18/2024   Contraception/Family Planning   Questions about contraception or family planning No        Reviewed and updated as needed this visit by Provider                    Past Medical History:   Diagnosis Date    Acute mastitis of left breast 2014    H/O abnormal cervical Papanicolaou smear 1999    Menarche     11 y/o    Traumatic injury during pregnancy 2013     Past Surgical History:   Procedure Laterality Date    APPENDECTOMY      13 y/o     SECTION   3/19/2013    Procedure:  SECTION;;  Surgeon: Maddie Stallworth MD;  Location: [facility]    COSMETIC MAMMOPLASTY AUGMENTATION BILATERAL Bilateral 2016    Silicone    CRYOCAUTERY OF CERVIX N/A age 18    HYSTEROSCOPY,DIAGNOSTIC  2008    REMV PILONIDAL LESION SIMPLE  10/20/2005     Lab work is in process  Labs reviewed in EPIC  BP Readings from Last 3 Encounters:   24 116/82   24 114/81   22 125/81    Wt Readings from Last 3 Encounters:   24 59.9 kg (132 lb 1.6 oz)   24 58.4 kg (128 lb 12.8 oz)   22 57.9 kg (127 lb 11.2 oz)                  Patient Active Problem List   Diagnosis    CARDIOVASCULAR SCREENING; LDL GOAL LESS THAN 160    General counseling for prescription of oral contraceptives    H/O abnormal cervical Papanicolaou smear    History of dysplastic nevus    Seborrheic keratoses    Multiple benign nevi    Solar lentiginosis     Past Surgical History:   Procedure Laterality Date    APPENDECTOMY      15 y/o     SECTION  3/19/2013    Procedure:  SECTION;;  Surgeon: Maddie Stallworth MD;  [facility]: [facility]    COSMETIC MAMMOPLASTY AUGMENTATION BILATERAL Bilateral 2016    Silicone    CRYOCAUTERY OF CERVIX N/A age 18    HYSTEROSCOPY,DIAGNOSTIC  2008    REMV PILONIDAL LESION SIMPLE  10/20/2005       Social History     Tobacco Use    Smoking status: Never    Smokeless tobacco: Never   Substance Use Topics    Alcohol use: Yes     Alcohol/week: 0.0 standard drinks of alcohol     Comment: occasionally     Family History   Problem Relation Age of Onset    Hypertension Mother     Cancer Mother         uterine    Cerebrovascular Disease Father     Cancer Father         lymphoma and bladder    Hypertension Father     Alzheimer Disease Maternal Grandmother     Cerebrovascular Disease Maternal Grandmother     Alzheimer Disease Paternal Grandfather     Diabetes No family hx of     Coronary Artery Disease No family hx of     Hyperlipidemia No family hx of   "   Breast Cancer No family hx of     Colon Cancer No family hx of     Prostate Cancer No family hx of     Anxiety Disorder No family hx of     Depression No family hx of     Thyroid Disease No family hx of     Genetic Disorder No family hx of          No current outpatient medications on file.     No Known Allergies    CONSTITUTIONAL:NEGATIVE for fever, chills, change in weight  INTEGUMENTARY/SKIN: NEGATIVE for worrisome rashes, moles or lesions  EYES: NEGATIVE for vision changes or irritation  ENT: NEGATIVE for ear, mouth and throat problems  RESP:NEGATIVE for significant cough or SOB  BREAST: NEGATIVE for masses, tenderness or discharge  CV: NEGATIVE for chest pain, palpitations or peripheral edema  GI: NEGATIVE for nausea, abdominal pain, heartburn, or change in bowel habits   female: no unusual urinary symptoms, no unusual vaginal symptoms, and menses: has has menses which were just 2 weeks apart   MUSCULOSKELETAL:NEGATIVE for significant arthralgias or myalgia  NEURO: NEGATIVE for weakness, dizziness or paresthesias  ENDOCRINE: NEGATIVE for temperature intolerance, skin/hair changes  HEME/ALLERGY/IMMUNE: NEGATIVE for bleeding problems  PSYCHIATRIC: NEGATIVE for changes in mood or affect        Objective    Exam  /82 (BP Location: Right arm, Patient Position: Sitting, Cuff Size: Adult Regular)   Pulse 66   Temp 98  F (36.7  C) (Oral)   Resp 14   Ht 1.66 m (5' 5.35\")   Wt 59.9 kg (132 lb 1.6 oz)   LMP 03/04/2024 (Approximate)   SpO2 100%   BMI 21.75 kg/m     Estimated body mass index is 21.75 kg/m  as calculated from the following:    Height as of this encounter: 1.66 m (5' 5.35\").    Weight as of this encounter: 59.9 kg (132 lb 1.6 oz).    Physical Exam  GENERAL: alert and no distress  EYES: Eyes grossly normal to inspection and conjunctivae and sclerae normal  HENT: ear canals and TM's normal, nose and mouth without ulcers or lesions  NECK: no adenopathy, no asymmetry, masses, or scars  RESP: " lungs clear to auscultation - no rales, rhonchi or wheezes  BREAST: normal without masses, tenderness or nipple discharge and no palpable axillary masses or adenopathy  CV: regular rates and rhythm, no murmur, click or rub, peripheral pulses strong, and no peripheral edema  ABDOMEN: soft, nontender, no hepatosplenomegaly, no masses and bowel sounds normal   (female): normal female external genitalia, normal urethral meatus , normal vaginal mucosa, and normal cervix, adnexae, and uterus without masses.  MS: no gross musculoskeletal defects noted, no edema  SKIN: no suspicious lesions or rashes  NEURO: Normal strength and tone, mentation intact and speech normal  PSYCH: mentation appears normal, affect normal/bright  LYMPH: no cervical, supraclavicular, axillary, or inguinal adenopathy        Signed Electronically by: THAO Lee CNP

## 2024-03-18 NOTE — PROGRESS NOTES
Writer received Cancer Risk Management Program referral, referred for:    family hx lymphoma, uterine cancer, bladder, kidney      Reviewed for appropriate plan, and sent to New Patient Scheduling for completion.

## 2024-03-20 LAB
BKR LAB AP GYN ADEQUACY: NORMAL
BKR LAB AP GYN INTERPRETATION: NORMAL
BKR LAB AP HPV REFLEX: NORMAL
BKR LAB AP LMP: NORMAL
BKR LAB AP PREVIOUS ABNORMAL: NORMAL
PATH REPORT.COMMENTS IMP SPEC: NORMAL
PATH REPORT.COMMENTS IMP SPEC: NORMAL
PATH REPORT.RELEVANT HX SPEC: NORMAL

## 2024-03-21 ENCOUNTER — ANCILLARY PROCEDURE (OUTPATIENT)
Dept: ULTRASOUND IMAGING | Facility: CLINIC | Age: 44
End: 2024-03-21
Attending: NURSE PRACTITIONER
Payer: COMMERCIAL

## 2024-03-21 DIAGNOSIS — N93.8 DUB (DYSFUNCTIONAL UTERINE BLEEDING): ICD-10-CM

## 2024-03-21 LAB
HUMAN PAPILLOMA VIRUS 16 DNA: NEGATIVE
HUMAN PAPILLOMA VIRUS 18 DNA: NEGATIVE
HUMAN PAPILLOMA VIRUS FINAL DIAGNOSIS: NORMAL
HUMAN PAPILLOMA VIRUS OTHER HR: NEGATIVE

## 2024-03-21 PROCEDURE — 76856 US EXAM PELVIC COMPLETE: CPT

## 2024-03-21 PROCEDURE — 76830 TRANSVAGINAL US NON-OB: CPT

## 2024-03-28 NOTE — RESULT ENCOUNTER NOTE
Shannon Smith,    Attached are your test results.  Your ultrasound shows you may have some back up of fluid in the right fallopian tube but may have been that way for a while. No fibroid.   Am going to refer you to GYN as also noted some vascular congestion and will see what GYN recommends   Please be aware that coverage of these services is subject to the terms and limitations of your health insurance plan.  Call member services at your health plan with any benefit or coverage questions.  [facility] will call you to coordinate your care as prescribed by your provider. If you don't hear from a representative within 2 business days, please call [phone].       Please contact us if you have any questions.    Raina Lopes, CNP

## 2024-04-24 NOTE — PATIENT INSTRUCTIONS
Wound Care After a Biopsy    What is a skin biopsy?  A skin biopsy allows the doctor to examine a very small piece of tissue under the microscope to determine the diagnosis and the best treatment for the skin condition. A local anesthetic (numbing medicine) is injected with a very small needle into the skin area to be tested. A small piece of skin is taken from the area. Sometimes a suture (stitch) is used.     What are the risks of a skin biopsy?  I will experience scar, bleeding, swelling, pain, crusting and redness. I may experience incomplete removal or recurrence. Risks of this procedure are excessive bleeding, bruising, infection, nerve damage, numbness, thick (hypertrophic or keloidal) scar and non-diagnostic biopsy.    How should I care for my wound for the first 24 hours?  Keep the wound dry and covered for 24 hours  If it bleeds, hold direct pressure on the area for 15 minutes. If bleeding does not stop, call us or go to the emergency room  Avoid strenuous exercise the first 1-2 days or as your doctor instructs you    How should I care for the wound after 24 hours?  After 24 hours, remove the bandage  You may bathe or shower as normal  If you had a scalp biopsy, you can shampoo as usual and can use shower water to clean the biopsy site daily  Clean the wound once a day with gentle soap and water  Do not scrub, be gentle  Apply white petroleum/Vaseline after cleaning the wound with a cotton swab or a clean finger, and keep the site covered with a Bandaid /bandage. Bandages are not necessary with a scalp biopsy  If you are unable to cover the site with a Bandaid /bandage, re-apply ointment 2-3 times a day to keep the site moist. Moisture will help with healing  Avoid strenuous activity for first 1-2 days  Avoid lakes, rivers, pools, and oceans until the stitches are removed or the site is healed    How do I clean my wound?  Wash hands thoroughly with soap or use hand  before all wound  care  Clean the wound with gentle soap and water  Apply white petroleum/Vaseline  to wound after it is clean  Replace the Bandaid /bandage to keep the wound covered for the first few days or as instructed by your doctor  If you had a scalp biopsy, warm shower water to the area on a daily basis should suffice    What should I use to clean my wound?   Cotton-tipped applicators (Qtips )  White petroleum jelly (Vaseline ). Use a clean new container and use Q-tips to apply.  Bandaids  as needed  Gentle soap     How should I care for my wound long term?  Do not get your wound dirty  Keep up with wound care for one week or until the area is healed.  A small scab will form and fall off by itself when the area is completely healed. The area will be red and will become pink in color as it heals. Sun protection is very important for how your scar will turn out. Sunscreen with an SPF 30 or greater is recommended once the area is healed.  You should have some soreness but it should be mild and slowly go away over several days. Talk to your doctor about using tylenol for pain,    When should I call my doctor?  If you have increased:   Pain or swelling  Pus or drainage (clear or slightly yellow drainage is ok)  Temperature over 100F  Spreading redness or warmth around wound    When will I hear about my results?  The biopsy results can take 2 weeks to come back.  Your results will automatically release to [facility] before your provider has even reviewed them.  The clinic will call you with the results, send you a [facility] message, or have you schedule a follow-up clinic or phone time to discuss the results.  Contact our clinics if you do not hear from us in 2 weeks.    Who should I call with questions?  [facility]: [phone]  [facility]: [phone]  For urgent needs outside of business hours call the [facility] at [phone] and ask for the dermatology  resident on call   Checking for Skin Cancer  You can help find cancer early by checking your skin each month. There are 3 main kinds of skin cancer: melanoma, basal cell carcinoma, and squamous cell carcinoma. Doing monthly skin checks is the best way to find new marks, sores, or skin changes. Follow these instructions for checking your skin.   The ABCDEs of checking moles for melanoma   Check your moles or growths for signs of melanoma using ABCDE:   Asymmetry: The sides of the mole or growth don t match.  Border: The edges are ragged, notched, or blurred.  Color: The color within the mole or growth varies. It could be black, brown, tan, white, or shades of red, gray, or blue.  Diameter: The mole or growth is larger than   inch or 6 mm (size of a pencil eraser).  Evolving: The size, shape, texture, or color of the mole or growth is changing.     ABCDE's of moles on light skin.        ABCDE's of moles on dark skin may be harder to identify.     Checking for other types of skin cancer  Basal cell carcinoma or squamous cell carcinoma cause symptoms like:     A spot or mole that looks different from all other marks on your skin  Changes in how an area feels, such as itching, tenderness, or pain  Changes in the skin's surface, such as oozing, bleeding, or scaliness  A sore that doesn't heal  New swelling, redness, or spread of color beyond the border of a mole    Who s at risk?  Anyone of any skin color can get skin cancer. But you're at greater risk if you have:   Fair skin that freckles easily and burns instead of tanning  Light-colored or red hair  Light-colored eyes  Many moles or abnormal moles on your skin  A long history of unprotected exposure to sunlight or tanning beds  A history of many blistering sunburns as a child or teen  A family history of skin cancer  Been exposed to radiation or chemicals  A weakened immune system  Been exposed to arsenic  If you've had skin cancer in the past, you're at high risk of  having it again.   How to check your skin  Do your monthly skin checkups in front of a full-length mirror. Use a room with good lighting so it's easier to see. Use a hand mirror to look at hard-to-see places like your buttocks and back. You can also have a trusted friend or family member help you with these checks. Check every part of your body, including your:   Head (ears, face, neck, and scalp)  Torso (front, back, sides, and under breasts)  Arms (tops, undersides, and armpits)  Hands (palms, backs, and fingers, including under the nails)  Lower back, buttocks, and genitals  Legs (front, back, and sides)  Feet (tops, soles, toes, including under the nails, and between toes)  Watch for new spots on your skin or a spot that's changing in color, shape, size.   If you have a lot of moles, take digital photos of them each month. Make sure to take photos both up close and from a distance. These can help you see if any moles change over time.   Know your skin  Most skin changes aren't cancer. But if you see any changes in your skin, call your healthcare provider right away. Only they can tell you if a change is a problem. If you have skin cancer, seeing your provider can be the first step to getting the treatment that could save your life.   [employer] last reviewed this educational content on 10/1/2021    7271-1512 [employer]. All rights reserved. This information is not intended as a substitute for professional medical care. Always follow your healthcare professional's instructions.

## 2024-04-24 NOTE — PROGRESS NOTES
[facility] Dermatology Note  Encounter Date: Apr 26, 2024  Office Visit     Dermatology Problem List:  Last skin check 2/23/23, recommended yearly  0. NUB, L ventral forearm, s/p bx 04/26/24  0. NUB, R lateral back, s/p shave removal 04/26/24  1. 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  - s/p biopsy 9/27/2016, s/p shave bx 5/7/19  2. History of benign biopsies  - Angiofibroma - right posterior neck, s/p bx 2/23/21     Family history: negative for skin cancer.     ____________________________________________    Assessment & Plan:    # History of dysplastic nevi, no clinical evidence of recurrence today. No repigmentation  - ABCDEs: Counseled ABCDEs of melanoma: Asymmetry, Border (irregularity), Color (not uniform, changes in color), Diameter (greater than 6 mm which is about the size of a pencil eraser), and Evolving (any changes in preexisting moles).  - Sun protection: Counseled SPF30+ sunscreen, UPF clothing, sun avoidance, tanning bed avoidance.  - Recommended regular skin checks.     # Seborrheic keratosis, non irritated.  Left breast   - Assured patient of benign nature. No treatment necessary unless lesion becomes symptomatic.      # Neoplasm of unspecified behavior of the skin (D49.2) on the L ventral forearm. The differential diagnosis includes DN. .    - 3 mm pigmented macule with thick distal network     # nEVUS R lateral back. The differential diagnosis includes normal nevus. PT  reports catching   - Fleshy brown papule. Patient reports catching.    Procedures Performed:   - Shave biopsy procedure note, location(s): L ventral forearm. After discussion of benefits and risks including but not limited to bleeding, infection, scar, incomplete removal, recurrence, and non-diagnostic biopsy, written consent and photographs were obtained.  The area was cleaned with isopropyl alcohol. 0.5mL of 1% lidocaine with epinephrine was injected to obtain adequate anesthesia of lesion(s). Shave biopsy at site(s) performed. Hemostasis was achieved with aluminium chloride. Petrolatum ointment and a sterile dressing were applied. The patient tolerated the procedure and no complications were noted. The patient was provided with verbal and written post care instructions.     - Shave removal, location(s): R lateral back.  After discussion of benefits and risks including but not limited to bleeding/bruising, pain/swelling, infection, scar, incomplete removal, nerve damage/numbness, recurrence, and non-diagnostic biopsy, written consent, verbal consent and photographs were obtained. Time-out was performed. The area was cleaned with isopropyl alcohol. 0.5mL of 1% lidocaine with epinephrine was injected to obtain adequate anesthesia of each lesion. Shave removal was performed. Hemostasis was achieved with aluminium chloride. Vaseline and a sterile dressing were applied. The patient tolerated the procedure and no complications were noted. The patient was provided with verbal and written post care instructions.      Follow-up: 1 year(s) in-person, or earlier for new or changing lesions    Staff and Scribe:     Scribe Disclosure:   I, Paz Marin, am serving as a scribe to document services personally performed by Ellen Okeefe MD based on data collection and the provider's statements to me.       Provider Disclosure:   The documentation recorded by the scribe accurately reflects the services I personally performed and the decisions made by me.    Ellen Okeefe MD    Department of Dermatology  [facility]: Phone: [phone], Fax:[phone]  [facility]: Phone: [phone], Fax: [phone]   ____________________________________________    CC: Skin  Check (FBSE.  Area of concern under left breast. HX of DN. )    HPI:  Ms. Kaia Smith is a(n) 43 year old female who presents today as a return patient for skin check.    Today, patient reports a spot on her L breast she would like checked. Unsure when exactly she noticed it.      Patient is otherwise feeling well, without additional skin concerns.    Labs Reviewed:  N/A    Physical Exam:  Vitals: LMP 03/04/2024 (Approximate)   SKIN: Total skin excluding the undergarment areas was performed. The exam included the head/face, neck, both arms, chest, back, abdomen, both legs, digits and/or nails.  Natacha Hernandez EMT declines genital or buttocks exam  - There is a waxy stuck on tan to brown papule under L breast..   - There is a 3 mm pigmented macule on the L ventral forearm..   - There is a fleshy brown papule on the R lateral back..   - There is no erythema, telangectasias, nodularity, or pigmentation on the site of prior DN..   - No other lesions of concern on areas examined.     Medications:  No current outpatient medications on file.     No current facility-administered medications for this visit.      Past Medical History:   Patient Active Problem List   Diagnosis    CARDIOVASCULAR SCREENING; LDL GOAL LESS THAN 160    General counseling for prescription of oral contraceptives    H/O abnormal cervical Papanicolaou smear    History of dysplastic nevus    Seborrheic keratoses    Multiple benign nevi    Solar lentiginosis     Past Medical History:   Diagnosis Date    Acute mastitis of left breast 2/7/2014    H/O abnormal cervical Papanicolaou smear 01/01/1999    Menarche     13 y/o    Traumatic injury during pregnancy 2/12/2013        CC No referring provider defined for this encounter. on close of this encounter.

## 2024-04-26 ENCOUNTER — OFFICE VISIT (OUTPATIENT)
Dept: DERMATOLOGY | Facility: CLINIC | Age: 44
End: 2024-04-26
Payer: COMMERCIAL

## 2024-04-26 DIAGNOSIS — D48.5 NEOPLASM OF UNCERTAIN BEHAVIOR OF SKIN: Primary | ICD-10-CM

## 2024-04-26 DIAGNOSIS — Z86.018 HISTORY OF DYSPLASTIC NEVUS: ICD-10-CM

## 2024-04-26 PROCEDURE — 11102 TANGNTL BX SKIN SINGLE LES: CPT | Mod: XS | Performed by: DERMATOLOGY

## 2024-04-26 PROCEDURE — 88342 IMHCHEM/IMCYTCHM 1ST ANTB: CPT | Performed by: PATHOLOGY

## 2024-04-26 PROCEDURE — 11300 SHAVE SKIN LESION 0.5 CM/<: CPT | Performed by: DERMATOLOGY

## 2024-04-26 PROCEDURE — 88341 IMHCHEM/IMCYTCHM EA ADD ANTB: CPT | Performed by: PATHOLOGY

## 2024-04-26 PROCEDURE — 99213 OFFICE O/P EST LOW 20 MIN: CPT | Mod: 25 | Performed by: DERMATOLOGY

## 2024-04-26 PROCEDURE — 88305 TISSUE EXAM BY PATHOLOGIST: CPT | Performed by: PATHOLOGY

## 2024-04-26 ASSESSMENT — PAIN SCALES - GENERAL: PAINLEVEL: NO PAIN (0)

## 2024-04-26 NOTE — NURSING NOTE
The following medication was given:     MEDICATION:  Lidocaine with epinephrine 1% 1:[id number]  ROUTE: SQ  SITE: see procedure note  DOSE: 1ml  LOT #: [id number]  : [employer]  EXPIRATION DATE: 01/31/2025  NDC#: 95508-058-69  Was there drug waste? Yes  Multi-dose vial: Yes    Natacha Hernandez  April 26, 2024

## 2024-04-26 NOTE — LETTER
4/26/2024         RE: Kaia Smith  [address] [place] [address]        Dear Colleague,    Thank you for referring your patient, Kaia Smith, to the [facility]. Please see a copy of my visit note below.      [facility] Dermatology Note  Encounter Date: Apr 26, 2024  Office Visit     Dermatology Problem List:  Last skin check 2/23/23, recommended yearly  0. NUB, L ventral forearm, s/p bx 04/26/24  0. NUB, R lateral back, s/p shave removal 04/26/24  1. 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  - s/p biopsy 9/27/2016, s/p shave bx 5/7/19  2. History of benign biopsies  - Angiofibroma - right posterior neck, s/p bx 2/23/21     Family history: negative for skin cancer.     ____________________________________________    Assessment & Plan:    # History of dysplastic nevi, no clinical evidence of recurrence today. No repigmentation  - ABCDEs: Counseled ABCDEs of melanoma: Asymmetry, Border (irregularity), Color (not uniform, changes in color), Diameter (greater than 6 mm which is about the size of a pencil eraser), and Evolving (any changes in preexisting moles).  - Sun protection: Counseled SPF30+ sunscreen, UPF clothing, sun avoidance, tanning bed avoidance.  - Recommended regular skin checks.     # Seborrheic keratosis, non irritated.  Left breast   - Assured patient of benign nature. No treatment necessary unless lesion becomes symptomatic.      # Neoplasm of unspecified behavior of the skin (D49.2) on the L ventral forearm. The differential diagnosis includes DN. .    - 3 mm pigmented macule with thick distal network     # nEVUS R lateral back. The differential diagnosis includes normal nevus. PT  reports catching   - Fleshy brown papule. Patient reports catching.    Procedures Performed:   - Shave  biopsy procedure note, location(s): L ventral forearm. After discussion of benefits and risks including but not limited to bleeding, infection, scar, incomplete removal, recurrence, and non-diagnostic biopsy, written consent and photographs were obtained. The area was cleaned with isopropyl alcohol. 0.5mL of 1% lidocaine with epinephrine was injected to obtain adequate anesthesia of lesion(s). Shave biopsy at site(s) performed. Hemostasis was achieved with aluminium chloride. Petrolatum ointment and a sterile dressing were applied. The patient tolerated the procedure and no complications were noted. The patient was provided with verbal and written post care instructions.     - Shave removal, location(s): R lateral back.  After discussion of benefits and risks including but not limited to bleeding/bruising, pain/swelling, infection, scar, incomplete removal, nerve damage/numbness, recurrence, and non-diagnostic biopsy, written consent, verbal consent and photographs were obtained. Time-out was performed. The area was cleaned with isopropyl alcohol. 0.5mL of 1% lidocaine with epinephrine was injected to obtain adequate anesthesia of each lesion. Shave removal was performed. Hemostasis was achieved with aluminium chloride. Vaseline and a sterile dressing were applied. The patient tolerated the procedure and no complications were noted. The patient was provided with verbal and written post care instructions.      Follow-up: 1 year(s) in-person, or earlier for new or changing lesions    Staff and Scribe:     Scribe Disclosure:   ISIS, Paz Marin, am serving as a scribe to document services personally performed by Ellen Okeefe MD based on data collection and the provider's statements to me.       Provider Disclosure:   The documentation recorded by the scribe accurately reflects the services I personally performed and the decisions made by me.    Ellen Okeefe MD    Department of Dermatology  [facility]: Phone: [phone], Fax:[phone]  [facility]: Phone: [phone], Fax: [phone]   ____________________________________________    CC: Skin Check (FBSE.  Area of concern under left breast. HX of DN. )    HPI:  Ms. Kaia Smith is a(n) 43 year old female who presents today as a return patient for skin check.    Today, patient reports a spot on her L breast she would like checked. Unsure when exactly she noticed it.      Patient is otherwise feeling well, without additional skin concerns.    Labs Reviewed:  N/A    Physical Exam:  Vitals: LMP 03/04/2024 (Approximate)   SKIN: Total skin excluding the undergarment areas was performed. The exam included the head/face, neck, both arms, chest, back, abdomen, both legs, digits and/or nails.  Natacha Hernandez EMT declines genital or buttocks exam  - There is a waxy stuck on tan to brown papule under L breast..   - There is a 3 mm pigmented macule on the L ventral forearm..   - There is a fleshy brown papule on the R lateral back..   - There is no erythema, telangectasias, nodularity, or pigmentation on the site of prior DN..   - No other lesions of concern on areas examined.     Medications:  No current outpatient medications on file.     No current facility-administered medications for this visit.      Past Medical History:   Patient Active Problem List   Diagnosis     CARDIOVASCULAR SCREENING; LDL GOAL LESS THAN 160     General counseling for prescription of oral contraceptives     H/O abnormal cervical Papanicolaou smear     History of dysplastic nevus     Seborrheic keratoses     Multiple benign nevi     Solar lentiginosis     Past Medical History:   Diagnosis Date     Acute mastitis of left breast 2/7/2014     H/O abnormal cervical Papanicolaou smear 01/01/1999     Menarche     13 y/o     Traumatic injury during pregnancy 2/12/2013        CC No referring  provider defined for this encounter. on close of this encounter.      Again, thank you for allowing me to participate in the care of your patient.        Sincerely,        Ellen Okeefe MD

## 2024-04-26 NOTE — NURSING NOTE
Kaia Smith's goals for this visit include:   Chief Complaint   Patient presents with    Skin Check     FBSE.  Area of concern under left breast. HX of DN.       She requests these members of her care team be copied on today's visit information:     PCP: No Ref-Primary, Physician    Referring Provider:  Raina Lopes, THAO CNP  [address]  [facility] [place]  [place] [address]    [facility] 03/04/2024 (Approximate)     Do you need any medication refills at today's visit?     Natacha Hernandez on 4/26/2024 at 11:03 AM

## 2024-05-01 LAB
PATH REPORT.COMMENTS IMP SPEC: NORMAL
PATH REPORT.FINAL DX SPEC: NORMAL
PATH REPORT.GROSS SPEC: NORMAL
PATH REPORT.MICROSCOPIC SPEC OTHER STN: NORMAL
PATH REPORT.RELEVANT HX SPEC: NORMAL

## 2024-05-01 NOTE — RESULT ENCOUNTER NOTE
See below  A(1). Skin, Left ventral forearm, shave:  -  Junctional dysplastic nevus with moderate atypia -  recheck within 1 year    B(2). Skin, Right lateral back, shave:  - Intradermal melanocytic nevus -  normal mole

## 2024-05-02 ENCOUNTER — TELEPHONE (OUTPATIENT)
Dept: DERMATOLOGY | Facility: CLINIC | Age: 44
End: 2024-05-02
Payer: COMMERCIAL

## 2024-05-02 NOTE — TELEPHONE ENCOUNTER
"Case Report   Surgical Pathology Report                         Case: [id number]                                   Authorizing Provider:  Ellen Okeefe MD           Collected:           04/26/2024 11:10 AM           Ordering Location:     [facility]   Received:            04/26/2024 11:57 AM                                  [place]                                                                   Pathologist:           Pranav Swan MD                                                       Specimens:   A) - Skin, Left ventral forearm                                                                      B) - Skin, Right lateral back                                                              Final Diagnosis   A(1). Skin, Left ventral forearm, shave:  -  Junctional dysplastic nevus with moderate atypia - (see comment)     B(2). Skin, Right lateral back, shave:  - Intradermal melanocytic nevus - (see description)      Electronically signed by Pranav Swan MD on 5/1/2024 at 11:18 AM   Comment  [facility]   A. The lesion appears narrowly excised. Clinical follow up is recommended, with re-excision if the lesion persists or recurs.   Clinical Information  [facility]   The patient is a 43 year old female   Gross Description  [facility]   A(1). Skin, Left ventral forearm:  The specimen is received in formalin with proper patient identification, labeled \"L ventral forearm\".  The specimen consists of a 0.5 x 0.1 cm white-tan skin shave remarkable for a 0.1 cm brown-tan lesion.  The subcutaneous surface is inked black, and the specimen is bisected and entirely submitted in cassette A1.            B(2). Skin, Right lateral back:  The specimen is received in formalin with proper patient identification, labeled \"right lateral back\".  The specimen consists of a 0.7 x 0.3 cm white-tan skin shave remarkable for a 0.5 x 0.2 x 0.1 cm raised brown-tan lesion.  The subcutaneous surface is inked " black, and the specimen is serially sectioned and entirely submitted in cassette B1.              Microscopic Description  [facility]   A. The specimen exhibits a junctional melanocytic proliferation with an apparent narrow lateral diameter which is both nested and single celled, with moderate cytologic atypia and architectural disorder but cells largely confined to the sides and bases of rete ridges, above papillary dermal fibrosis and a mild superficial perivascular lymphocytic infiltrate with melanophages. PRAME stains sparse cells.  Melan-A stained sections highlight melanocytes in a pattern consistent with the above histologic impression. The lesion appears narrowly excised.     B. The specimen exhibits a predominantly intradermal proliferation of melanocytes in nests and cords which mature with descent. The lesion extends to the deep margin.         Disclaimer  [facility]   Analyte Specific Reagents (ASRs) are used in many laboratory tests necessary for standard medical care and generally do not require FDA approval. This test was developed and its performance characteristics determined by [facility]. It has not been cleared or approved by the U.S. Food and Drug Administration.  [facility] are certified for the performance of high-complexity clinical testing under the Clinical Laboratory Improvement Amendments of 1988 (CLIA), and in keeping with the certification requirements, the laboratory has verified this test's accuracy, precision and/or validity of the method.      Performing Labs  [facility]   The technical component of this testing was completed at [facility]              Specimen Collected: 04/26/24 11:10 AM Last Resulted: 05/01/24 11:18 AM       View All Conversations on this Encounter           Scans on Order [id number]       Result Care Coordination      Result Notes      Jayda Cannon CMA  5/2/2024 10:31 AM CDT Back to Top      Patient returned call to clinic.  She has been notified of results and recommendation to monitor DN on forearm and recheck at next skin check as scheduled in 1 year.    Yu Haley RN  5/2/2024  9:45 AM CDT       Writer called pt, no answer. Left message for pt to return our call at [phone].        Yu Haley RN on 5/2/2024 at 9:45 AM    Ellen Okeefe MD  5/1/2024  6:48 PM CDT       See below  A(1). Skin, Left ventral forearm, shave:  -  Junctional dysplastic nevus with moderate atypia -  recheck within 1 year     B(2). Skin, Right lateral back, shave:  - Intradermal melanocytic nevus -  normal mole

## 2024-06-03 ENCOUNTER — E-VISIT (OUTPATIENT)
Dept: FAMILY MEDICINE | Facility: CLINIC | Age: 44
End: 2024-06-03
Payer: COMMERCIAL

## 2024-06-03 DIAGNOSIS — N89.8 VAGINAL DISCHARGE: Primary | ICD-10-CM

## 2024-06-03 PROCEDURE — 99421 OL DIG E/M SVC 5-10 MIN: CPT | Performed by: NURSE PRACTITIONER

## 2024-06-06 ENCOUNTER — LAB (OUTPATIENT)
Dept: LAB | Facility: CLINIC | Age: 44
End: 2024-06-06
Payer: COMMERCIAL

## 2024-06-06 DIAGNOSIS — N89.8 VAGINAL DISCHARGE: ICD-10-CM

## 2024-06-06 DIAGNOSIS — Z13.0 SCREENING FOR DISORDER OF BLOOD AND BLOOD-FORMING ORGANS: ICD-10-CM

## 2024-06-06 DIAGNOSIS — Z13.1 SCREENING FOR DIABETES MELLITUS (DM): ICD-10-CM

## 2024-06-06 DIAGNOSIS — Z13.29 SCREENING FOR THYROID DISORDER: ICD-10-CM

## 2024-06-06 DIAGNOSIS — Z13.6 CARDIOVASCULAR SCREENING; LDL GOAL LESS THAN 160: ICD-10-CM

## 2024-06-06 DIAGNOSIS — Z11.59 NEED FOR HEPATITIS C SCREENING TEST: ICD-10-CM

## 2024-06-06 LAB
ALBUMIN SERPL BCG-MCNC: 4.6 G/DL (ref 3.5–5.2)
ALP SERPL-CCNC: 52 U/L (ref 40–150)
ALT SERPL W P-5'-P-CCNC: 18 U/L (ref 0–50)
ANION GAP SERPL CALCULATED.3IONS-SCNC: 10 MMOL/L (ref 7–15)
AST SERPL W P-5'-P-CCNC: 27 U/L (ref 0–45)
BILIRUB SERPL-MCNC: 1.1 MG/DL
BUN SERPL-MCNC: 12.9 MG/DL (ref 6–20)
CALCIUM SERPL-MCNC: 9.3 MG/DL (ref 8.6–10)
CHLORIDE SERPL-SCNC: 103 MMOL/L (ref 98–107)
CHOLEST SERPL-MCNC: 190 MG/DL
CLUE CELLS: ABNORMAL
CREAT SERPL-MCNC: 0.73 MG/DL (ref 0.51–0.95)
DEPRECATED HCO3 PLAS-SCNC: 25 MMOL/L (ref 22–29)
EGFRCR SERPLBLD CKD-EPI 2021: >90 ML/MIN/1.73M2
ERYTHROCYTE [DISTWIDTH] IN BLOOD BY AUTOMATED COUNT: 12.9 % (ref 10–15)
FASTING STATUS PATIENT QL REPORTED: YES
FASTING STATUS PATIENT QL REPORTED: YES
GLUCOSE SERPL-MCNC: 86 MG/DL (ref 70–99)
HCT VFR BLD AUTO: 37 % (ref 35–47)
HCV AB SERPL QL IA: NONREACTIVE
HDLC SERPL-MCNC: 75 MG/DL
HGB BLD-MCNC: 12.3 G/DL (ref 11.7–15.7)
LDLC SERPL CALC-MCNC: 103 MG/DL
MCH RBC QN AUTO: 27.8 PG (ref 26.5–33)
MCHC RBC AUTO-ENTMCNC: 33.2 G/DL (ref 31.5–36.5)
MCV RBC AUTO: 84 FL (ref 78–100)
NONHDLC SERPL-MCNC: 115 MG/DL
PLATELET # BLD AUTO: 343 10E3/UL (ref 150–450)
POTASSIUM SERPL-SCNC: 4.2 MMOL/L (ref 3.4–5.3)
PROT SERPL-MCNC: 7.4 G/DL (ref 6.4–8.3)
RBC # BLD AUTO: 4.43 10E6/UL (ref 3.8–5.2)
SODIUM SERPL-SCNC: 138 MMOL/L (ref 135–145)
TRICHOMONAS, WET PREP: ABNORMAL
TRIGL SERPL-MCNC: 58 MG/DL
TSH SERPL DL<=0.005 MIU/L-ACNC: 1.9 UIU/ML (ref 0.3–4.2)
WBC # BLD AUTO: 7.3 10E3/UL (ref 4–11)
WBC'S/HIGH POWER FIELD, WET PREP: ABNORMAL
YEAST, WET PREP: ABNORMAL

## 2024-06-06 PROCEDURE — 87210 SMEAR WET MOUNT SALINE/INK: CPT

## 2024-06-06 PROCEDURE — 85027 COMPLETE CBC AUTOMATED: CPT

## 2024-06-06 PROCEDURE — 86803 HEPATITIS C AB TEST: CPT

## 2024-06-06 PROCEDURE — 36415 COLL VENOUS BLD VENIPUNCTURE: CPT

## 2024-06-06 PROCEDURE — 84443 ASSAY THYROID STIM HORMONE: CPT

## 2024-06-06 PROCEDURE — 80053 COMPREHEN METABOLIC PANEL: CPT

## 2024-06-06 PROCEDURE — 80061 LIPID PANEL: CPT

## 2024-06-10 NOTE — RESULT ENCOUNTER NOTE
Shannon Smith,    Attached are your test results.  -Normal red blood cell (hgb) levels, normal white blood cell count and normal platelet levels.  -LDL(bad) cholesterol level is elevated which can increase your heart disease risk.  A diet high in fat and simple carbohydrates, genetics and being overweight can contribute to this. ADVISE: exercising 150 minutes of aerobic exercise per week (30 minutes for 5 days per week or 50 minutes for 3 days per week are options) and eating a low saturated fat/low carbohydrate diet are helpful to improve this. In 12 months, you should recheck your fasting cholesterol panel by scheduling a lab-only appointment.  -Liver and gallbladder tests are normal (ALT,AST, Alk phos, bilirubin), kidney function is normal (Cr, GFR), sodium is normal, potassium is normal, calcium is normal, glucose is normal.  -TSH (thyroid stimulating hormone) level is normal which indicates normal thyroid function.  -Hepatitis C antibody screen test shows no signs of a previous hepatitis C infection.  -No signs of bacteria or yeast vaginal infections.   Please contact us if you have any questions.    Raina Lopes, CNP

## 2024-06-26 ENCOUNTER — VIRTUAL VISIT (OUTPATIENT)
Dept: ONCOLOGY | Facility: CLINIC | Age: 44
End: 2024-06-26
Attending: GENETIC COUNSELOR, MS
Payer: COMMERCIAL

## 2024-06-26 DIAGNOSIS — Z80.51 FAMILY HISTORY OF MALIGNANT NEOPLASM OF KIDNEY: ICD-10-CM

## 2024-06-26 DIAGNOSIS — Z80.3 FAMILY HISTORY OF MALIGNANT NEOPLASM OF BREAST: ICD-10-CM

## 2024-06-26 DIAGNOSIS — Z80.49 FAMILY HISTORY OF MALIGNANT NEOPLASM OF UTERUS: Primary | ICD-10-CM

## 2024-06-26 PROCEDURE — 96040 HC GENETIC COUNSELING, EACH 30 MINUTES: CPT | Mod: TEL,95 | Performed by: GENETIC COUNSELOR, MS

## 2024-06-26 NOTE — Clinical Note
"6/26/2024      Kaia Smith  [address]  [place] [address]      Dear Colleague,    Thank you for referring your patient, Kaia Smith, to the [facility]. Please see a copy of my visit note below.    Virtual Visit Details    Type of service:  Telephone Visit   Phone call duration: *** minutes   Originating Location (pt. Location): {patient location:493934::\"Home\"}  {PROVIDER LOCATION On-site should be selected for visits conducted from your clinic location or adjoining [facility] hospital, academic office, or other nearby [facility] building. Off-site should be selected for all other provider locations, including home:[id number]}  Distant Location (provider location):  {virtual location provider:[id number]}      Again, thank you for allowing me to participate in the care of your patient.        Sincerely,        Eliseo De La Garza GC  "

## 2024-06-26 NOTE — PROGRESS NOTES
2024    Referring Provider: THAO Kincaid CNP    Presenting Information:   I met with Kaia for her telephone genetic counseling visit, through the Cancer Risk Management Program, to discuss her family history of uterine and breast cancer. Today we reviewed this history, cancer screening recommendations, and available genetic testing options.    Personal History:  Kaia is a 43 year old year old female. She does not have any personal history of cancer. She had her first menstrual period at age 12, her first child at age 28, and is premenopausal. Kaia has her ovaries, fallopian tubes and uterus in place, and she has had no ovarian cancer screening to date. She reports that she has not used hormone replacement therapy. She has annual clinical breast exams and mammograms; her most recent mammogram in 2023 was normal. Kaia has not had a colonoscopy. She does not regularly do any other cancer screening at this time.     Family History: (Please see scanned pedigree for detailed family history information)  Kaia's mother was diagnosed with uterine cancer at age 69 and  at age 70.  Kaia's father was diagnosed with lymphoma at age 55. He was later diagnosed with bladder cancer at age 65 and  at age 70. He is reported to have a history of smoking.  A paternal aunt was diagnosed with kidney cancer in her 60's.  Her daughter, Kaia's cousin,  from breast cancer at age 42.  There is no other reported family history of cancer on either side of her family. However, her mother is an only child.  Her maternal ethnicity is [place]. Her paternal ethnicity is Polish. There is no known Ashkenazi Taoist ancestry on either side of her family. There is no reported consanguinity.    Discussion:  Based upon her current personal and family history, Kaia is likely at low risk for a hereditary cancer syndrome.   We discussed the features commonly associated with hereditary cancer syndromes, and a handout regarding  this was provided to Kaia today. This can be found in the after visit summary.  As discussed in our session, her family history is absent for the following features typically seen in high risk families:   Several people with the same or related types of cancer,  Cancers diagnosed at a young age (before age 50),  Individuals with more than one primary cancer,  Multiple generations of the family affected with the same or related types of cancer.    Because of the absence of these features, it is unlikely that there is a currently identifiable hereditary cancer syndrome present in Kaia's family.    We discussed that while her cousin was diagnosed at a young age with breast cancer, she is more distantly related to Kaia, and Kaia has numerous other female relatives on her paternal side that do NOT have a history of breast or ovarian cancer. As such, Kaia is likely at lower risk for hereditary cancer from her paternal side of the family.  We discussed that insurance coverage for genetic testing is dependent upon personal and family history being suggestive of a hereditary cancer syndrome. Based on her current family history, Kaia does NOT meet criteria for genetic testing. As such, Kaia elected to do the cash price option for her genetic testing through [facility].  We discussed that there are many genes that could cause increased risk for uterine and/or breast cancer. As many of these genes present with overlapping features in a family and accurate cancer risk cannot always be established based upon the pedigree analysis alone, it would be reasonable for Kaia to consider panel genetic testing to analyze multiple genes at once.  Genetic testing is available for Arteaga syndrome as part of the patient's core panel. This will then be automatically reflexed to a Custom Cancer panel through [facility].  Genetic testing is available for 62 genes associated with hereditary cancer: Custom Cancers panel (APC, JUAN, AXIN2, BAP1, BARD1,  BLM, BMPR1A, BRCA1, BRCA2, BRIP1, BUB1B, CDC73, CDH1, CDK4, CDKN2A, CDKN1C, CEP57, CHEK2, CTNNA1, DICER1, DIS3L2, EPCAM, FH, FLCN, GREM1, GPC3, HOXB13, KIT, MBD4, MEN1, MET, MLH1, MSH2, MSH3, MSH6, MUTYH, NF1, NTHL1, PALB2, PDGFRA, PMS2, POLD1, POLE, PTEN, RAD51C, RAD51D, REST, SDHA, SDHB, SDHC, SDHD, SMAD4, SMARCA4, SMARCB1, STK11, TP53, TRIM28, TRIP13, TSC1, TSC2, VHL, and WT1).  We discussed that many of the genes in the Custom Cancers panel are associated with specific hereditary cancer syndromes and published management guidelines: Hereditary Breast and Ovarian Cancer syndrome (BRCA1, BRCA2), Arteaga syndrome (MLH1, MSH2, MSH6, PMS2, EPCAM), Familial Adenomatous Polyposis (APC), Hereditary Diffuse Gastric Cancer (CDH1), Familial Atypical Multiple Mole Melanoma syndrome (CDK4, CDKN2A), Juvenile Polyposis syndrome (BMPR1A, SMAD4), Cowden syndrome (PTEN), Li Fraumeni syndrome (TP53), Peutz-Jeghers syndrome (STK11), MUTYH Associated Polyposis (MUTYH), Hereditary Leiomyomatosis and Renal Cell Cancer (FH), Mwrz-Kwji-Gyoh (FLCN), Hereditary Papillary Renal Carcinoma (MET), Tuberous Sclerosis complex (TSC1, TSC2), Neurofibromatosis type 1 (NF1), Multiple Endocrine Neoplasia type 1 (MEN1), Hereditary Paraganglioma and Pheochromocytoma (SDHA, SDHB, SDHC, SDHD), and von Hippel-Lindau (VHL).   The JUAN, AXIN2, BRIP1, CHEK2, GREM1, MSH3, NBN, NTHL1, PALB2, POLD1, POLE, RAD51C, and RAD51D genes are associated with increased cancer risk and have published management guidelines for certain cancers.    The remaining genes (BAP1, BARD1, BLM, BUB1B, CDC73, CDKN1C, CEP57, CTNNA1, DICER1, DIS3L2, GPC3, HOXB13, KIT, PDGFRA, REST, SMARCA4, SMARCB1, TRIM28, TRIP13, WT1) are associated with increased cancer risk and may allow us to make medical recommendations when mutations are identified.    Kaia stated that she would prefer to submit a saliva kit for her genetic testing. [facility] will send a kit directly to her home with directions on  how to collect a saliva sample. We discussed that there is a small chance for sample failure due to contamination of the sample. To help minimize this, she should follow the directions that are sent with the kit. Kaia verbalized understanding of this. Once the sample is collected, she will send it to [facility] using the return envelope and prepaid shipping label.   Verbal consent was given over the phone and written on the consent form. Turnaround time is approximately 4 weeks once the lab receives the sample.  Should Kaia have any questions regarding the billing for her genetic testing, she should visit [facility]'s billing website at [email or web address] or call them at [phone].  The possible outcomes of positive, negative, and uncertain were discussed with Kaia. A detailed handout that explains this in detail was provided to the patient.  Medical Management: For Kaia, we reviewed that the information from genetic testing may determine:  additional cancer screening for which Kaia may qualify (i.e. mammogram and breast MRI, more frequent colonoscopies, more frequent dermatologic exams, etc.),  options for risk reducing surgeries Kaia could consider (i.e. bilateral mastectomy, surgery to remove her ovaries and/or uterus, etc.),    and targeted chemotherapies if she were to develop certain cancers in the future (i.e. immunotherapy for individuals with Arteaga syndrome, PARP inhibitors, etc.).   These recommendations and possible targeted chemotherapies will be discussed in detail once genetic testing is completed.     Plan:  1) Today Kaia elected to proceed with Arteaga syndrome testing with automatic reflex to a Custom Cancer panel through [facility].  2) A copy of the consent form and the after visit summary will be sent to Kaia.  3) This information should be available in approximately 4 weeks, once the lab receives the sample.  4) I will call Kaia with the results once they  become available.    Time spent on the phone: 22 minutes    ADDENDUM: updated referring provider's name    Eliseo De La Garza MS, [facility] [place]ensed, Certified Genetic Counselor      Virtual Visit Details    Type of service:  Telephone Visit     Originating Location (pt. Location): Home  Distant Location (provider location):  Off-site

## 2024-06-26 NOTE — LETTER
2024    Kaia Smith  [address] [place] [address]      Dear Kaia,    It was a pleasure speaking with you on the phone on 2024. Here is a copy of the progress note from our discussion. If you have any additional questions, please feel free to call.    Referring Provider: Raina    Presenting Information:   I met with Kaia for her telephone genetic counseling visit, through the Cancer Risk Management Program, to discuss her family history of uterine and breast cancer. Today we reviewed this history, cancer screening recommendations, and available genetic testing options.    Personal History:  Kaia is a 43 year old year old female. She does not have any personal history of cancer. She had her first menstrual period at age 12, her first child at age 28, and is premenopausal. Kaia has her ovaries, fallopian tubes and uterus in place, and she has had no ovarian cancer screening to date. She reports that she has not used hormone replacement therapy. She has annual clinical breast exams and mammograms; her most recent mammogram in 2023 was normal. Kaia has not had a colonoscopy. She does not regularly do any other cancer screening at this time.     Family History: (Please see scanned pedigree for detailed family history information)  Kaia's mother was diagnosed with uterine cancer at age 69 and  at age 70.  Kaia's father was diagnosed with lymphoma at age 55. He was later diagnosed with bladder cancer at age 65 and  at age 70. He is reported to have a history of smoking.  A paternal aunt was diagnosed with kidney cancer in her 60's.  Her daughter, Kaia's cousin,  from breast cancer at age 42.  There is no other reported family history of cancer on either side of her family. However, her mother is an only child.  Her maternal ethnicity is [place]. Her paternal ethnicity is Polish. There is no known Ashkenazi Christianity ancestry on either side of her family. There is no reported  consanguinity.    Discussion:  Based upon her current personal and family history, Kaia is likely at low risk for a hereditary cancer syndrome.   We discussed the features commonly associated with hereditary cancer syndromes, and a handout regarding this was provided to Kaia today. This can be found in the after visit summary.  As discussed in our session, her family history is absent for the following features typically seen in high risk families:   Several people with the same or related types of cancer,  Cancers diagnosed at a young age (before age 50),  Individuals with more than one primary cancer,  Multiple generations of the family affected with the same or related types of cancer.    Because of the absence of these features, it is unlikely that there is a currently identifiable hereditary cancer syndrome present in Kaia's family.    We discussed that while her cousin was diagnosed at a young age with breast cancer, she is more distantly related to Kaia, and Kaia has numerous other female relatives on her paternal side that do NOT have a history of breast or ovarian cancer. As such, Kaia is likely at lower risk for hereditary cancer from her paternal side of the family.  We discussed that insurance coverage for genetic testing is dependent upon personal and family history being suggestive of a hereditary cancer syndrome. Based on her current family history, Kaia does NOT meet criteria for genetic testing. As such, Kaia elected to do the cash price option for her genetic testing through [facility] [employer]  We discussed that there are many genes that could cause increased risk for uterine and/or breast cancer. As many of these genes present with overlapping features in a family and accurate cancer risk cannot always be established based upon the pedigree analysis alone, it would be reasonable for Kaia to consider panel genetic testing to analyze multiple genes at once.  Genetic testing is available for Arteaga syndrome as  part of the patient's core panel. This will then be automatically reflexed to a Custom Cancer panel through [facility].  Genetic testing is available for 62 genes associated with hereditary cancer: Custom Cancers panel (APC, JUAN, AXIN2, BAP1, BARD1, BLM, BMPR1A, BRCA1, BRCA2, BRIP1, BUB1B, CDC73, CDH1, CDK4, CDKN2A, CDKN1C, CEP57, CHEK2, CTNNA1, DICER1, DIS3L2, EPCAM, FH, FLCN, GREM1, GPC3, HOXB13, KIT, MBD4, MEN1, MET, MLH1, MSH2, MSH3, MSH6, MUTYH, NF1, NTHL1, PALB2, PDGFRA, PMS2, POLD1, POLE, PTEN, RAD51C, RAD51D, REST, SDHA, SDHB, SDHC, SDHD, SMAD4, SMARCA4, SMARCB1, STK11, TP53, TRIM28, TRIP13, TSC1, TSC2, VHL, and WT1).  We discussed that many of the genes in the Custom Cancers panel are associated with specific hereditary cancer syndromes and published management guidelines: Hereditary Breast and Ovarian Cancer syndrome (BRCA1, BRCA2), Arteaga syndrome (MLH1, MSH2, MSH6, PMS2, EPCAM), Familial Adenomatous Polyposis (APC), Hereditary Diffuse Gastric Cancer (CDH1), Familial Atypical Multiple Mole Melanoma syndrome (CDK4, CDKN2A), Juvenile Polyposis syndrome (BMPR1A, SMAD4), Cowden syndrome (PTEN), Li Fraumeni syndrome (TP53), Peutz-Jeghers syndrome (STK11), MUTYH Associated Polyposis (MUTYH), Hereditary Leiomyomatosis and Renal Cell Cancer (FH), Yuir-Iacy-Tawz (FLCN), Hereditary Papillary Renal Carcinoma (MET), Tuberous Sclerosis complex (TSC1, TSC2), Neurofibromatosis type 1 (NF1), Multiple Endocrine Neoplasia type 1 (MEN1), Hereditary Paraganglioma and Pheochromocytoma (SDHA, SDHB, SDHC, SDHD), and von Hippel-Lindau (VHL).   The JUAN, AXIN2, BRIP1, CHEK2, GREM1, MSH3, NBN, NTHL1, PALB2, POLD1, POLE, RAD51C, and RAD51D genes are associated with increased cancer risk and have published management guidelines for certain cancers.    The remaining genes (BAP1, BARD1, BLM, BUB1B, CDC73, CDKN1C, CEP57, CTNNA1, DICER1, DIS3L2, GPC3, HOXB13, KIT, PDGFRA, REST, SMARCA4, SMARCB1, TRIM28, TRIP13, WT1) are associated with  increased cancer risk and may allow us to make medical recommendations when mutations are identified.    Kaia stated that she would prefer to submit a saliva kit for her genetic testing. "[employer] will send a kit directly to her home with directions on how to collect a saliva sample. We discussed that there is a small chance for sample failure due to contamination of the sample. To help minimize this, she should follow the directions that are sent with the kit. Kaia verbalized understanding of this. Once the sample is collected, she will send it to "[employer] using the return envelope and prepaid shipping label.   Verbal consent was given over the phone and written on the consent form. Turnaround time is approximately 4 weeks once the lab receives the sample.  Should Kaia have any questions regarding the billing for her genetic testing, she should visit "[employer]'s billing website at [email or web address] or call them at [phone].  The possible outcomes of positive, negative, and uncertain were discussed with Kaia. A detailed handout that explains this in detail was provided to the patient.  Medical Management: For Kaia, we reviewed that the information from genetic testing may determine:  additional cancer screening for which Kaia may qualify (i.e. mammogram and breast MRI, more frequent colonoscopies, more frequent dermatologic exams, etc.),  options for risk reducing surgeries Kaia could consider (i.e. bilateral mastectomy, surgery to remove her ovaries and/or uterus, etc.),    and targeted chemotherapies if she were to develop certain cancers in the future (i.e. immunotherapy for individuals with Aretaga syndrome, PARP inhibitors, etc.).   These recommendations and possible targeted chemotherapies will be discussed in detail once genetic testing is completed.     Plan:  1) Today Kaia elected to proceed with Arteaga syndrome testing with automatic reflex to a Custom Cancer panel  through [facility].  2) A copy of the consent form and the after visit summary will be sent to Kaia.  3) This information should be available in approximately 4 weeks, once the lab receives the sample.  4) I will call Kaia with the results once they become available.    Time spent on the phone: 22 minutes    Eliseo De La Garza MS, [facility] [place]  Licensed, Certified Genetic Counselor

## 2024-06-26 NOTE — PATIENT INSTRUCTIONS
Assessing Cancer Risk  Cancer is a common diagnosis which impacts many families.  Individuals may develop cancer due to environmental factors (such as exposures and lifestyle), aging, genetic predisposition, or a combination of these factors.      Only about 5-10% of cancers are thought to be due to an inherited cancer susceptibility gene.    These families often have:  Several people with the same or related types of cancer  Cancers diagnosed at a young age (before age 50)  Individuals with more than one primary cancer  Multiple generations of the family affected with cancer    Comprehensive Breast and Gynecologic Cancer Panel  We each inherit two copies of every gene in our bodies: one from our mother, and one from our father. Each gene has a specific job to do.  When a gene has a mistake or  mutation  in it, it does not work like it should.     Some people may be candidates for genetic testing of more than one gene.  For these families, genetic testing using a cancer panel may be offered. These panels will test different genes at once known to increase the risk for breast, ovarian, uterine, and/or other cancers.    This handout will review common hereditary breast and gynecologic cancer syndromes. The genes that will be discussed in this handout are: JUAN, BRCA1, BRCA2, BRIP1, CDH1, CHEK2, MLH1, MSH2, MSH6, PMS2, EPCAM, PTEN, PALB2, RAD51C, RAD51D, and TP53.    The purpose of this handout is to serve as a brief summary of the breast and gynecologic cancer risk genes that have published clinical management guidelines for individuals who are found to carry a mutation. Inheriting a mutation does not mean a person will develop cancer, but it does significantly increase their risk above the general population risk.     ______________________________________________________________________________    Hereditary Breast and Ovarian Cancer Syndrome (BRCA1 and BRCA2)  A single mutation in one of the copies of BRCA1 or  BRCA2 increases the risk for breast and ovarian cancer, among others.  The risk for pancreatic cancer and melanoma may also be slightly increased in some families.  The chart below shows the chance that someone with a BRCA mutation would develop cancer in his or her lifetime1,2,3,4.       Lifetime Cancer Risks    General Population BRCA1  BRCA2   Breast  12% >60% >60%   Ovarian  1-2% 39-58% 13-29%   Prostate 12% 7-26% 19-61%   Male Breast 0.1% 0.2-1.2% 1.8-7.1%   Pancreas 1-2% Up to 5% 5-10%     A person s ethnic background is also important to consider, as individuals of Ashkenazi Buddhist ancestry have a higher chance of having a BRCA gene mutation.  There are three BRCA mutations that occur more frequently in this population.      Arteaga Syndrome (MLH1, MSH2, MSH6, PMS2, and EPCAM)  Currently five genes are known to cause Arteaga Syndrome: MLH1, MSH2, MSH6, PMS2, and EPCAM.  A single mutation in one of the Arteaga Syndrome genes increases the risk for colon, endometrial, ovarian, and stomach cancers.  Other cancers that occur less commonly in Arteaga Syndrome include urinary tract, skin, and brain cancers.  The chart below shows the chance that a person with Arteaga syndrome would develop cancer in his or her lifetime5.      Lifetime Cancer Risks    General Population Arteaga Syndrome   Colon 5% 10-61%   Endometrial 3% 13-57%   Ovarian 1-2% 1-38%   Stomach <1% 1-9%   *Cancer risk varies depending on Arteaga syndrome gene found      Cowden Syndrome (PTEN)  Cowden syndrome is a hereditary condition that increases the risk for breast, thyroid, endometrial, colon, and kidney cancer.  Cowden syndrome is caused by a mutation in the PTEN gene.  A single mutation in one of the copies of PTEN causes Cowden syndrome and increases cancer risk.  The chart below shows the chance that someone with a PTEN mutation would develop cancer in their lifetime6,7.  Other benign features seen in some individuals with Cowden syndrome include benign  skin lesions (facial papules, keratoses, lipomas), learning disability, autism, thyroid nodules, colon polyps, and larger head size.     Lifetime Cancer Risks    General Population Cowden   Breast 12% 40-60%*   Thyroid 1% Up to 38%   Renal 1-2% Up to 35%   Endometrial 3% Up to 28%   Colon 5% Up to 9%   Melanoma 2-3% Up to 6%   *Emerging data suggests the risk for breast cancer could be greater than 60%               Li-Fraumeni Syndrome (TP53)  Li-Fraumeni Syndrome (LFS) is a cancer predisposition syndrome caused by a mutation in the TP53 gene. A single mutation in one of the copies of TP53 increases the risk for multiple cancers. Individuals with LFS are at an increased risk for developing cancer at a young age. The lifetime risk for development of a LFS-associated cancer is 50% by age 30 and 90% by age 60.   Core Cancers: Sarcomas, Breast, Brain, Lung, Leukemias/Lymphomas, Adrenocortical carcinomas  Other Cancers: Gastrointestinal, Thyroid, Skin, Genitourinary       Hereditary Diffuse Gastric Cancer (CDH1)  Currently, one gene is known to cause hereditary diffuse gastric cancer (HDGC): CDH1.  Individuals with HDGC are at increased risk for diffuse gastric cancer and lobular breast cancer. Of people diagnosed with HDGC, 30-50% have a mutation in the CDH1 gene.  This suggests there are likely other genes that may cause HDGC that have not been identified yet.      Lifetime Cancer Risks    General Population HDGC   Diffuse Gastric  <1% ~80%   Breast 12% 41-60%       Additional Genes    JUAN  JUAN is a moderate-risk breast cancer gene. Women who have a mutation in JUAN can have between a 2-4 fold increased risk for breast cancer compared to the general population8. JUAN mutations have also been associated with increased risk for pancreatic cancer between 5-10%9. Individuals who inherit two JUAN mutations have a condition called ataxia-telangiectasia (AT).  This rare autosomal recessive condition affects the nervous system  and immune system, and is associated with progressive cerebellar ataxia beginning in childhood. Individuals with ataxia-telangiectasia often have a weakened immune system and have an increased risk for childhood cancers.    PALB2  Mutations in PALB2 have been shown to increase the risk of breast cancer up to 41-60% in some families; where individuals fall within this risk range is dependent upon family [id number]. PALB2 mutations have also been associated with increased risk for pancreatic cancer between 5-10%.  Individuals who inherit two PALB2 mutations--one from their mother and one from their father--have a condition called Fanconi Anemia.  This rare autosomal recessive condition is associated with short stature, developmental delay, bone marrow failure, and increased risk for childhood cancers.    CHEK2   CHEK2 is a moderate-risk breast cancer gene.  Women who have a mutation in CHEK2 have around a 2-4 fold increased risk for breast cancer compared to the general population, and this risk may be higher depending upon family history.11,12,13 The risk of colon cancer may be twice as high as the general population risk of colon cancer of 5%. Mutations in CHEK2 have also been shown to increase the risk of other cancers, including prostate, however these cancer risks are currently not well understood.    BRIP1, RAD51C and RAD51D  Mutations in RAD51C and RAD51D have been shown to increase the risk of ovarian cancer and breast cancer 14,. Mutations in BRIP1 have been shown to increase the risk of ovarian cancer and possibly female breast cancer 15 .       Lifetime Cancer Risk    General Population        BRIP1   RAD51C  RAD51D   Breast 12% Not well defined 20-40% 20-40%   Ovarian 1-2% 5-15% 10-15% 10-20%     ______________________________________________________________  Inheritance  All of the cancer syndromes reviewed above are inherited in an autosomal dominant pattern.  This means that if a parent has a mutation,  each of their children will have a 50% chance of inheriting that same mutation. Therefore, each child --male or female-- would have a 50% chance of being at increased risk for developing cancer.    Image obtained from Genetics Home Reference, 2013     Mutations in some genes can occur de hannah, which means that a person s mutation occurred for the first time in them and was not inherited from a parent.  Now that they have the mutation, however, it can be passed on to future generations.    Genetic Testing  Genetic testing involves a blood test and will look for any harmful mutations that are associated with increased cancer risk.  If possible, it is recommended that the person(s) who has had cancer be tested before other family members.  That person will give us the most useful information about whether or not a specific gene is associated with the cancer in the family.    Results  There are three possible results of genetic testing:  Positive--a harmful mutation was identified in one or more of the genes  Negative--no mutations were identified in any of the genes tested  Variant of unknown significance--a variation in one of the genes was identified, but it is unclear how this impacts cancer risk in the family    Advantages and Disadvantages   There are advantages and disadvantages to genetic testing.    Advantages  May clarify your cancer risk  Can help you make medical decisions  May explain the cancers in your family  May give useful information to your family members (if you share your results)    Disadvantages  Possible negative emotional impact of learning about inherited cancer risk  Uncertainty in interpreting a negative test result in some situations  Possible genetic discrimination concerns (see below)    Genetic Information Nondiscrimination Act (EMMANUEL)  The Genetic Information Nondiscrimination Act of 2008 (EMMANUEL) is a federal law that protects individuals from health insurance or employment discrimination  based on a genetic test result alone (with some exceptions, including employers with fewer than 15 employees, and ).  Although rare, EMMANUEL  does not cover discrimination protections in terms of life insurance, long term care, or disability insurances.  Visit the National Human Websand Research Parnell website to learn more.    Reducing Cancer Risk  All of the genes described in this handout have nationally recognized cancer screening guidelines that would be recommended for individuals who test positive.  In addition to increased cancer screening, surgeries may be offered or recommended to reduce cancer risk.  Recommendations are based upon an individual s genetic test result as well as their personal and family history of cancer.    Questions to Think About Regarding Genetic Testing:  What effect will the test result have on me and my relationship with my family members if I have an inherited gene mutation?  If I don t have a gene mutation?  Should I share my test results, and how will my family react to this news, which may also affect them?  Are my children ready to learn new information that may one day affect their own health?    Hereditary Cancer Resources    FORCE: Facing Our Risk of Cancer Empowered facingourrisk.org   Bright Pink bebrightpink.org   Li-Fraumeni Syndrome Association lfsassociation.org   PTEN World PTENworld.com   No stomach for cancer, Inc. nostomachforcancer.org   Stomach cancer relief network Scrnet.org   Collaborative Group of the Americas on Inherited Colorectal Cancer (CGA) cgaicc.com    Cancer Care cancercare.org   American Cancer Society (ACS) cancer.org   National Cancer [place] (NCI) cancer.gov     Please call us if you have any questions or concerns.   Cancer Risk Management Program [phone] ([phone])  Eliseo De La Garza, MS [facility] [place]  [phone]  Julia Gonzales, MS, [facility] [place] [phone]  Alanna Lea, MS, [facility] [place]  [phone]  Nadeen Rutledge, MS, [facility] [place]  [phone]  Irena Bustamante,  MS, [facility] [place]  [phone]  Radha Marc, MS, [facility] [place] [phone]  Lilliana Rodriguez, MS, [facility] [place] [phone]    References  Shania Rendon PDP, Giovanna S, Luan WALLER, Sveta JE, Erin JL, Peter N, Juan H, Melida O, Chayito A, Pasini B, Radijo P, Manreena S, Scott DM, Duran N, Ximena E, Tate H, Jennings E, Charisse J, Gronag J, Enrico B, Tulinius H, Thorlacius S, Eerola H, Nevanlinna H, Michaelle K, Skyler OP. Average risks of breast and ovarian cancer associated with BRCA1 or BRCA2 mutations detected in case series unselected for family history: a combined analysis of 222 studies. Am J Hum Elizabeth. 2003;72:1117-30.  Marbella N, Alis M, Alan G.  BRCA1 and BRCA2 Hereditary Breast and Ovarian Cancer. Gene Reviews online. 2013.  Khang YC, Enid S, Dameon G, Palm S. Breast cancer risk among male BRCA1 and BRCA2 mutation carriers. J Natl Cancer Inst. 2007;99:1811-4.  David CHANG, Kari I, Wei J, Breonna E, Nish ER, Nadeen F. Risk of breast cancer in male BRCA2 carriers. J Med Elizabeth. 2010;47:710-1.  National Comprehensive Cancer Network. Clinical practice guidelines in oncology, colorectal cancer screening. Available online (registration required). 2015.  Wisam MH, He J, Carrie J, Ena PAUL, Casimiro MS, Eng C. Lifetime cancer risks in individuals with germline PTEN mutations. Clin Cancer Res. 2012;18:400-7.  Ten R. Cowden Syndrome: A Critical Review of the Clinical Literature. J Elizabeth . 2009:18:13-27.  Kassidy PENN, Clemente GRIFFITH, Mavis S, Meaghan P, Seth T, Aaron M, Sid B, Quentin H, Eloise R, Teodoro K, Kapil L, David CHANG, Scott GRIFFITH, Carter DF, Kristi MR, The Breast Cancer Susceptibility Collaboration (UK) & Mane BOYCE. JUAN mutations that cause ataxia-telangiectasia are breast cancer susceptibility alleles. Nature Genetics. 2006;38:873-875  Jerrell N , Juancho Y, Yajaira J, Andreas L, Glynn REDMOND , Royce ML, Jose Alberto S, Praveen AG, Hakan S, Roni ML, Wilder J , Duyen R, Melissa FSOTER, Faina  JR, Memo VE, Charisse M, Votesfayestein B, Reynaldo N, Juarez RH, Jose KW, and Mesfin AP. JUAN mutations in patients with hereditary pancreatic cancer. Cancer Discover. 2012;2:41-46  Joana BLOUNT., et al. Breast-Cancer Risk in Families with Mutations in PALB2. NEJM. 2014; 371(6):497-506.  CHEK2 Breast Cancer Case-Control Consortium. CHEK2*1100delC and susceptibility to breast cancer: A collaborative analysis involving 10,860 breast cancer cases and 9,065 controls from 10 studies. Am J Hum Elizabeth, 74 (2004), pp. 8147-3050  Nancy T, Rodolfo S, Howard K, et al. Spectrum of Mutations in BRCA1, BRCA2, CHEK2, and TP53 in Families at High Risk of Breast Cancer. ARNOL. 2006;295(12):2036-8546.   Rosey C, Raven D, Jalen PENN, et al. Risk of breast cancer in women with a CHEK2 mutation with and without a family history of breast cancer. J Clin Oncol. 2011;29:7907-0244.  Song H, Lamberts E, Ramus SJ, et al. Contribution of germline mutations in the RAD51B, RAD51C, and RAD51D genes to ovarian cancer in the population. J Clin Oncol. 2015;33(26):2303-8975. Doi:10.1200/JCO.2015.61.2408.  Erika T, Pacheco DF, Yamile P, et al. Mutations in BRIP1 confer high risk of ovarian cancer. Roxie Elizabeth. 2011;43(11):6073-5667. doi:10.1038/ng.955.

## 2024-06-26 NOTE — NURSING NOTE
Is the patient currently in the state of [place]? YES    Visit mode:TELEPHONE    If the visit is dropped, the patient can be reconnected by: TELEPHONE VISIT: Phone number:   Telephone Information:   Mobile [phone]       Will anyone else be joining the visit? NO  (If patient encounters technical issues they should call [phone])    How would you like to obtain your AVS? MyChart    Are changes needed to the allergy or medication list? N/A    Are refills needed on medications prescribed by this physician? NO    Reason for visit:  CONSULT    Adrianna WATSON

## 2024-07-07 ENCOUNTER — MYC MEDICAL ADVICE (OUTPATIENT)
Dept: DERMATOLOGY | Facility: CLINIC | Age: 44
End: 2024-07-07
Payer: COMMERCIAL

## 2024-07-08 NOTE — TELEPHONE ENCOUNTER
Excision/Mohs previsit information                                                    Diagnosis: Dysplastic Nevus   Site(s): left ventral forearm     Is the surgical site below the waist?  NO  If yes, instruct the patient to purchase over the counter chlorhexidine surgical soap and wash all skin below the belly button twice before surgery    No Known Allergies    Review and update allergy and medication list    Do you take the following medications:  Coumadin, Eliquis, Pradaxa, Xarelto:  NO.  If on Coumadin, INR should be checked within 7 days of surgery.  Range should be 3.5 or less or within therapeutic range.    Do you currently or have you previously had any of the following conditions:  Hepatitis:  NO  HIV/AIDS:  NO  Prolonged bleeding or bleeding disorder:  NO  Pacemaker: NO  Defibrillator:  NO  History of artificial or heart valve replacement:  NO  Endocarditis (inflammation of the inner lining of the heart's chambers and valves):  NO  Have you ever had a prosthetic joint infection:  NO  Pregnant or Breastfeeding:  NO  Mobility device (wheelchair, transfer difficulty): NO    Important Reminders:                                                      -Ok to take all of their medications as prescribed  -Patients can eat, no need to be fasting  -If face is being treated, please come with a make-up free face  -If scalp is being treated, please come with clean hair free from product  -Patient will not be able to get the site wet for 48 hrs  -No submerging wound in standing water (lake, pool, bathtub, hot tub) for 2 weeks  -No physical activity for 48 hrs (further restrictions will be discussed by MD at time of visit)    If any positives, send to RN for further review  Zahra Urbina RN

## 2024-07-08 NOTE — TELEPHONE ENCOUNTER
Called and talked with patient.   Excision scheduled for August 28th at 1:00pm. Patient had no questions at this time.     Zahra Urbina RN on 7/8/2024 at 3:35 PM

## 2024-07-30 ENCOUNTER — VIRTUAL VISIT (OUTPATIENT)
Dept: FAMILY MEDICINE | Facility: CLINIC | Age: 44
End: 2024-07-30
Payer: COMMERCIAL

## 2024-07-30 DIAGNOSIS — B02.9 HERPES ZOSTER WITHOUT COMPLICATION: Primary | ICD-10-CM

## 2024-07-30 PROCEDURE — 99213 OFFICE O/P EST LOW 20 MIN: CPT | Mod: 95 | Performed by: FAMILY MEDICINE

## 2024-07-30 RX ORDER — VALACYCLOVIR HYDROCHLORIDE 1 G/1
1000 TABLET, FILM COATED ORAL 3 TIMES DAILY
Qty: 21 TABLET | Refills: 0 | Status: SHIPPED | OUTPATIENT
Start: 2024-07-30 | End: 2024-08-06

## 2024-07-30 NOTE — PROGRESS NOTES
Kaia is a 43 year old who is being evaluated via a billable video visit.    How would you like to obtain your AVS? MyChart  If the video visit is dropped, the invitation should be resent by: Text to cell phone: [phone]  Will anyone else be joining your video visit? No      Assessment & Plan     Herpes zoster without complication  Sounds shingles. Poor exam per video. Will start with antiviral if not improved in 3-4 days advised in person visit. Pt agreed with plan.   - valACYclovir (VALTREX) 1000 mg tablet  Dispense: 21 tablet; Refill: 0        Subjective   Kaia is a 43 year old, presenting for the following health issues:  Derm Problem (Possible shingles on back and side/Rash with blisters, pain x 3-5 days)        7/30/2024    10:31 AM   Additional Questions   Roomed by Kathy TSE CMA     HPI   Chief Complaint   Patient presents with    Derm Problem     Possible shingles on back and side  Rash with blisters, pain x 3-5 days         Had covid couple weeks ago.   Then got stung by a bee then a couple days ago this rash developed. Left low back/ pain full. Red patch with little vesicles. Feeling a little bit of stiff neck but no current fever.       Review of Systems  Constitutional, HEENT, cardiovascular, pulmonary, gi and gu systems are negative, except as otherwise noted.      Objective           Vitals:  No vitals were obtained today due to virtual visit.    Physical Exam   GENERAL: alert and no distress  EYES: Eyes grossly normal to inspection.  No discharge or erythema, or obvious scleral/conjunctival abnormalities.  RESP: No audible wheeze, cough, or visible cyanosis.    SKIN: red rash left low back near left hip but difficult to examine clearly.   NEURO: Cranial nerves grossly intact.  Mentation and speech appropriate for age.  PSYCH: Appropriate affect, tone, and pace of words          Video-Visit Details    Type of service:  Video Visit   Originating Location (pt. Location): Home    Distant Location  (provider location):  Off-site  Platform used for Video Visit: Serafin  Signed Electronically by: Giovanna Golden MD

## 2024-08-28 ENCOUNTER — OFFICE VISIT (OUTPATIENT)
Dept: DERMATOLOGY | Facility: CLINIC | Age: 44
End: 2024-08-28
Payer: COMMERCIAL

## 2024-08-28 VITALS — DIASTOLIC BLOOD PRESSURE: 79 MMHG | HEART RATE: 62 BPM | OXYGEN SATURATION: 98 % | SYSTOLIC BLOOD PRESSURE: 117 MMHG

## 2024-08-28 DIAGNOSIS — D23.9 DYSPLASTIC NEVUS: ICD-10-CM

## 2024-08-28 PROCEDURE — 11401 EXC TR-EXT B9+MARG 0.6-1 CM: CPT | Performed by: DERMATOLOGY

## 2024-08-28 PROCEDURE — 12031 INTMD RPR S/A/T/EXT 2.5 CM/<: CPT | Performed by: DERMATOLOGY

## 2024-08-28 PROCEDURE — 88305 TISSUE EXAM BY PATHOLOGIST: CPT | Performed by: DERMATOLOGY

## 2024-08-28 ASSESSMENT — PAIN SCALES - GENERAL: PAINLEVEL: NO PAIN (0)

## 2024-08-28 NOTE — NURSING NOTE
Kaia Smith's chief complaint for this visit includes:  Chief Complaint   Patient presents with    Excision     DN repigmenting left ventral forearm     PCP: No Ref-Primary, Physician    Referring Provider:  Referred Self, MD  No address on file    /79   Pulse 62   LMP 07/15/2024 (Approximate)   SpO2 98%   No Pain (0)      No Known Allergies      Do you need any medication refills at today's visit? No    Jayda Cannon CMA          The following medication was given:     MEDICATION:  Lidocaine with epinephrine 1% [id number]  ROUTE: SQ  SITE: see procedure note  DOSE: 5.25 lido  LOT #: [id number]  : [employer]  EXPIRATION DATE: 01/31/2026  NDC#: 64538-889-74  Was there drug waste? 0.75 mL  Multi-dose vial: Yes    Mastisol, Steri-Strips, Tegaderm and pressure dressing applied to excision site on left ventral forearm.  Wound care instructions reviewed with patient and AVS provided.  Patient verbalized understanding.  Patient will follow up for suture removal: N/A.  No further questions or concerns at this time.    Jayda Cannon CMA  August 28, 2024

## 2024-08-28 NOTE — PROGRESS NOTES
DERMATOLOGY EXCISION PROCEDURE NOTE    Dermatology Problem List:  Last skin check 2/23/23, recommended yearly    1. 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  - s/p biopsy 9/27/2016, s/p shave bx 5/7/19  2. History of benign biopsies  - Angiofibroma - right posterior neck, s/p bx 2/23/21     Family history: negative for skin cancer.      ______________________________________    NAME OF PROCEDURE: Excision intermediate layered linear closure  Staff surgeon: Gigi Cavazos DO  Resident: none  Scrub Nurse: Jayda    PRE-OPERATIVE DIAGNOSIS:  Dysplastic nevus  POST-OPERATIVE DIAGNOSIS: Same   LOCATION: left ventral forearm  FINAL EXCISION SIZE(DEFECT SIZE): 1.0x0.9 cm  MARGIN: 0.3 cm  FINAL REPAIR LENGTH: 2.2 cm   ANESTHESIA: 5.25 1% lidocaine with 1:100,000 epinephrine    INDICATIONS: This patient presented with a 0.4x0.3 cm Dysplastic nevus. Excision was indicated. We discussed the principles of treatment and most likely complications including scarring, bleeding, infection, incomplete excision, wound dehiscence, pain, nerve damage, and recurrence. Informed consent was obtained and the patient underwent the procedure as follows:    PROCEDURE: The patient was taken to the operative suite. Time-out was performed.  The treatment area was anesthetized with 1% lidocaine with epinephrine. The area was prepped with Chlorhexidine and rinsed with sterile saline and draped with sterile towels. The lesion was delineated and excised down to subcutaneous fat in a elliptical manner. Hemostasis was obtained by electrocoagulation.     REPAIR: An intermediate layered linear closure was selected as the procedure which would maximally preserve both  function and cosmesis.    After the excision of the tumor, the area was carefully undermined. Hemostasis was obtained with bipolar electrocoagulation.  Closure was oriented so that the wound was in the patient's natural skin tension lines. The deeper layers of subcutaneous and superficial (nonmuscle) fascia and dermal layers were then closed with 4-0 Monocryl sutures (deep layer suturing). The epidermis was then carefully approximated along the length of the wound using 4-0 Monocryl running subcuticular sutures (superficial layer suturing).     Estimated blood loss was less than 10 ml for all surgical sites. A sterile pressure dressing was applied and wound care instructions, with a written handout, were given. The [facility] was [facility] alert and ambulatory.    The patient elected for pathology results to automatically release and understands that the clinical staff will contact them as soon as possible to notify them of the results.    Follow-up: PRN    Dr. Gigi Cavazos was immediately available for the entire surgery and was physicially present for the key portions of the procedure.    Anatomic Pathology Results: pending    Clinical Follow-Up: as scheduled with Dr. Okeefe 5/1/25    Staff Involved:  Scribe/Staff    Scribe Disclosure:   I, Trice Miller, am serving as a scribe; to document services personally performed by Dr. Gigi Cavazos - -based on data collection and the provider's statements to me.     Provider Disclosure:   The documentation recorded by the scribe accurately reflects the services I personally performed and the decisions made by me. I personally performed the procedures today.      Gigi Cavazos DO    Department of Dermatology  [facility]: Phone: [phone], Fax:[phone]  [facility]: Phone: [phone], Fax: [phone]

## 2024-08-28 NOTE — LETTER
8/28/2024      Kaia Smith  [address] [place] [address]      Dear Colleague,    Thank you for referring your patient, Kaia Smith, to the [facility]. Please see a copy of my visit note below.    DERMATOLOGY EXCISION PROCEDURE NOTE    Dermatology Problem List:  Last skin check 2/23/23, recommended yearly    1. 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  - s/p biopsy 9/27/2016, s/p shave bx 5/7/19  2. History of benign biopsies  - Angiofibroma - right posterior neck, s/p bx 2/23/21     Family history: negative for skin cancer.      ______________________________________    NAME OF PROCEDURE: Excision intermediate layered linear closure  Staff surgeon: Gigi Cavazos DO  Resident: none  Scrub Nurse: Jayda    PRE-OPERATIVE DIAGNOSIS:  Dysplastic nevus  POST-OPERATIVE DIAGNOSIS: Same   LOCATION: left ventral forearm  FINAL EXCISION SIZE(DEFECT SIZE): 1.0x0.9 cm  MARGIN: 0.3 cm  FINAL REPAIR LENGTH: 2.2 cm   ANESTHESIA: 5.25 1% lidocaine with 1:100,000 epinephrine    INDICATIONS: This patient presented with a 0.4x0.3 cm Dysplastic nevus. Excision was indicated. We discussed the principles of treatment and most likely complications including scarring, bleeding, infection, incomplete excision, wound dehiscence, pain, nerve damage, and recurrence. Informed consent was obtained and the patient underwent the procedure as follows:    PROCEDURE: The patient was taken to the operative suite. Time-out was performed.  The treatment area was anesthetized with 1% lidocaine with epinephrine. The area was prepped with Chlorhexidine and rinsed with sterile saline and draped with sterile towels. The lesion was  delineated and excised down to subcutaneous fat in a elliptical manner. Hemostasis was obtained by electrocoagulation.     REPAIR: An intermediate layered linear closure was selected as the procedure which would maximally preserve both function and cosmesis.    After the excision of the tumor, the area was carefully undermined. Hemostasis was obtained with bipolar electrocoagulation.  Closure was oriented so that the wound was in the patient's natural skin tension lines. The deeper layers of subcutaneous and superficial (nonmuscle) fascia and dermal layers were then closed with 4-0 Monocryl sutures (deep layer suturing). The epidermis was then carefully approximated along the length of the wound using 4-0 Monocryl running subcuticular sutures (superficial layer suturing).     Estimated blood loss was less than 10 ml for all surgical sites. A sterile pressure dressing was applied and wound care instructions, with a written handout, were given. The patient was discharged from the [facility] alert and ambulatory.    The patient elected for pathology results to automatically release and understands that the clinical staff will contact them as soon as possible to notify them of the results.    Follow-up: PRN    Dr. Gigi Cavazos was immediately available for the entire surgery and was physicially present for the key portions of the procedure.    Anatomic Pathology Results: pending    Clinical Follow-Up: as scheduled with Dr. Okeefe 5/1/25    Staff Involved:  Scribe/Staff    Scribe Disclosure:   I, Trice Miller, am serving as a scribe; to document services personally performed by Dr. Gigi Cavazos - -based on data collection and the provider's statements to me.     Provider Disclosure:   The documentation recorded by the scribe accurately reflects the services I personally performed and the decisions made by me. I personally performed the procedures today.      Gigi Cavazos, DO  Assistant  Professor  Department of Dermatology  [facility]: Phone: [phone], Fax:[phone]  [facility]: Phone: [phone], Fax: [phone]      Again, thank you for allowing me to participate in the care of your patient.        Sincerely,        Gigi Cavazos MD

## 2024-08-28 NOTE — PATIENT INSTRUCTIONS
Caring for your skin after surgery    After your surgery, a pressure bandage will be placed over the area. This will prevent bleeding. Please follow these instructions over the next 1 to 2 weeks. Following this regimen will help to prevent complications as your wound heals.     For the first 48 hours after your surgery:    Leave the pressure dressing on and keep it dry. If it should come loose, you may re-tape it, but do not take it off.  Relax and take it easy. Do not do any vigorous exercise, heavy lifting or bending forward. This could cause the wound to bleed.  Post-operative pain is usually mild. You may alternate between 1000 mg of Tylenol (acetaminophen) and 400 mg of Ibuprofen every 4 hours.  Do not take more than 4,000 mg of acetaminophen in a 24-hour period or 3200 mg of Ibuprofen in a 24-hr period.  Avoid alcohol and vitamin E as these may increase your tendency to bleed.  You may put an ice pack around the bandaged area for 20 minutes at a time as needed. This may help reduce swelling, bruising, and pain. Make sure the ice pack is waterproof so that the pressure bandage doesn't get wet.  You may see a small amount of drainage or blood on your pressure bandage. This is normal. However:  If drainage or bleeding continues or saturates the bandage, you will need to apply firm pressure over the bandage with a clean washcloth for 15 minutes.  If bleeding continues after applying pressure for 15 minutes, apply an ice pack with gentle pressure to the bandaged area for another 15 minutes.  If bleeding still continues, call our office or go to the nearest emergency room.    48 Hours After Surgery:    Remove outer white bandage down to clear plastic film (Tegaderm).  You may notice dark brown, dried blood under the Tegaderm.  This is normal.    Leave the clear plastic film (Tegaderm) on for up to 2 weeks, as long as it is intact.  If it falls off prior to 2 weeks follow daily wound care below.  If it stays intact  for the full 2 weeks, then remove and treat as normal, healthy skin.    Daily Wound Care (if Tegaderm and Steri-Strips fall off prior to 2 weeks):    Wash wound with a mild soap and water.  Use caution when washing the wound, be gentle and do not let the forceful shower stream hit the wound directly. DO NOT WASH WITH HYDROGEN PEROXIDE AS THIS MIGHT CAUSE THE STITCHES TO DISSOLVE FASTER THAN WHAT WE WANT.    Pat dry.    Apply Vaseline (from a new container or tube) over the suture line with a Q-tip until it is completely healed. It is very important to keep the wound continuously moist, as wounds heal best in a moist environment.    Keep the site covered until it's healed.  You can cover it with a Telfa (non-stick) dressing and tape or a band-aid until healed with normal, healthy skin.      Call Us If:    You have bleeding that will not stop after applying pressure and ice.  You have pain that is not controlled with Tylenol and Ibuprofen.  You have signs or symptoms of an infection such as fever over 100 degrees Fahrenheit, redness, warmth or foul-smelling drainage from the wound    [facility]: [phone]   [facility]: [phone]  For urgent needs outside of business hours call the [facility] at [phone] and ask to speak with the dermatology resident on call

## 2024-08-29 ENCOUNTER — TELEPHONE (OUTPATIENT)
Dept: DERMATOLOGY | Facility: CLINIC | Age: 44
End: 2024-08-29
Payer: COMMERCIAL

## 2024-08-29 NOTE — TELEPHONE ENCOUNTER
"Kaia is 1 day s/p excision on her left forearm.  Patient reports \"all is well.\"  She has no questions or concerns.  Next skin check appt confirmed.    Gladys Soliman RN  "

## 2024-08-30 LAB
PATH REPORT.COMMENTS IMP SPEC: NORMAL
PATH REPORT.COMMENTS IMP SPEC: NORMAL
PATH REPORT.FINAL DX SPEC: NORMAL
PATH REPORT.GROSS SPEC: NORMAL
PATH REPORT.MICROSCOPIC SPEC OTHER STN: NORMAL
PATH REPORT.RELEVANT HX SPEC: NORMAL

## 2024-09-27 DIAGNOSIS — Z80.3 FAMILY HISTORY OF MALIGNANT NEOPLASM OF BREAST: ICD-10-CM

## 2024-09-27 DIAGNOSIS — Z80.49 FAMILY HISTORY OF MALIGNANT NEOPLASM OF UTERUS: Primary | ICD-10-CM

## 2024-09-27 DIAGNOSIS — Z80.51 FAMILY HISTORY OF MALIGNANT NEOPLASM OF KIDNEY: ICD-10-CM

## 2024-10-15 ENCOUNTER — VIRTUAL VISIT (OUTPATIENT)
Dept: ONCOLOGY | Facility: CLINIC | Age: 44
End: 2024-10-15
Attending: GENETIC COUNSELOR, MS
Payer: COMMERCIAL

## 2024-10-15 DIAGNOSIS — Z80.49 FAMILY HISTORY OF MALIGNANT NEOPLASM OF UTERUS: Primary | ICD-10-CM

## 2024-10-15 DIAGNOSIS — Z80.3 FAMILY HISTORY OF MALIGNANT NEOPLASM OF BREAST: ICD-10-CM

## 2024-10-15 DIAGNOSIS — Z80.51 FAMILY HISTORY OF MALIGNANT NEOPLASM OF KIDNEY: ICD-10-CM

## 2024-10-15 PROCEDURE — 999N000069 HC STATISTIC GENETIC COUNSELING, < 16 MIN: Mod: TEL,95 | Performed by: GENETIC COUNSELOR, MS

## 2024-10-15 NOTE — PROGRESS NOTES
"10/15/2024    Referring Provider: THAO Kincaid CNP     Presenting Information:  I spoke to Kaia by telephone today to discuss her genetic testing results. A saliva kit was sent to her house on 6/26/24. A Custom Cancer panel was ordered from [employer]. This testing was done because of Kaia's family history of uterine and breast cancer.    Genetic Testing Result: NEGATIVE  Kaia is negative for any pathogenic (harmful) mutations in APC, JUAN, AXIN2, BAP1, BARD1, BLM, BMPR1A, BRCA1, BRCA2, BRIP1, BUB1B, CDC73, CDH1, CDK4, CDKN2A, CDKN1C, CEP57, CHEK2, CTNNA1, DICER1, DIS3L2, EPCAM, FH, FLCN, GREM1, GPC3, HOXB13, KIT, MBD4, MEN1, MET, MLH1, MSH2, MSH3, MSH6, MUTYH, NF1, NTHL1, PALB2, PDGFRA, PMS2, POLD1, POLE, PTEN, RAD51C, RAD51D, REST, SDHA, SDHB, SDHC, SDHD, SMAD4, SMARCA4, SMARCB1, STK11, TP53, TRIM28, TRIP13, TSC1, TSC2, VHL, and WT1. No pathogenic mutations were found in any of the 62 genes analyzed. This test involved sequencing and deletion/duplication analysis of all genes with the exceptions of EPCAM and GREM1 (deletions/duplications only) and SDHA (sequencing only).    A copy of the test report can be found in the Laboratory tab, dated 8/23/24, and named \"LABORATORY MISCELLANEOUS ORDER\". The report is scanned in as a linked document.    Interpretation:  We discussed several different interpretations of this negative test result.    One explanation may be that there is a different gene or combination of genes and environment that are associated with the cancers in this family.  It is possible that her paternal cousin with breast cancer did have a mutation in one of these genes and she did not inherit it.  There is also a small possibility that there is a mutation in one of these genes, and the testing laboratory could not find it with their current testing methods.       Screening:  Based on this negative test result, it is important for Kaia and her relatives to refer back to the family history for " appropriate cancer screening.    Due to Kaia's family history of uterine cancer, Kaia remains at slightly increased risk for uterine cancer. We discussed available uterine cancer screening (pelvic exams, endometrial sampling, transvaginal ultrasounds) as well as the significant limitations of this screening. As such, this screening is not typically recommended. That being said, women in this family should discuss their family history, this screening, and the signs and symptoms of uterine cancer with their primary OB/GYN provider, as they may have individualized recommendations.  Other population cancer screening options, such as those recommended by the American Cancer Society and the National Comprehensive Cancer Network (NCCN), are also appropriate for Kaia and her family. These screening recommendations may change if there are changes to Kaia's personal and/or family history of cancer. Final screening recommendations should be made in consultation with each individual's primary care provider.      Inheritance:  We reviewed autosomal dominant inheritance. We discussed that Kaia did not pass on an identifiable mutation in these genes to her children based on this test result. Mutations in these genes do not skip generations.      Additional Testing Considerations:  Although Kaia's genetic testing result was negative, other relatives may still carry a gene mutation associated with breast, uterine, and/or kidney cancer. Genetic counseling is recommended for other close paternal relatives to discuss genetic testing options. If any of these relatives do pursue genetic testing, Kaia is encouraged to contact me so that we may review the impact of their test results on her.    Summary:  We do not have an explanation for Kaia's family history of cancer. While no genetic changes were identified, Kaia may still be at risk for certain cancers due to family history, environmental factors, or other genetic causes not identified  by this test. Because of that, it is important that she continue with cancer screening based on her personal and family history as discussed above.    Genetic testing is rapidly advancing, and new cancer susceptibility genes will most likely be identified in the future. Therefore, I encouraged Kaia to contact me regularly or if there are changes in her personal or family history. This may change how we assess her cancer risk, screening, and the testing we would offer.    Plan:  1.  A copy of the test results will be sent to Kaia.  2. She plans to follow-up with her other providers.  3. She should contact me regularly, or sooner if her family history changes.    If Kaia has any further questions, I encouraged her to contact me via [facility].    Time spent on the phone: 5 minutes.    Eliseo De La Garza MS, [facility] [place]  Licensed, Certified Genetic Counselor      Virtual Visit Details    Type of service:  Telephone Visit     Originating Location (pt. Location): Home  Distant Location (provider location):  Off-site

## 2024-10-15 NOTE — NURSING NOTE
Current patient location:  Car parked    Is the patient currently in the state of [place]? YES    Visit mode:TELEPHONE    If the visit is dropped, the patient can be reconnected by: TELEPHONE VISIT: Phone number:   Telephone Information:   Mobile [phone]       Will anyone else be joining the visit? NO  (If patient encounters technical issues they should call [phone] :[phone])    Are changes needed to the allergy or medication list? N/A    Are refills needed on medications prescribed by this physician? NO    Rooming Documentation:  Questionnaire(s) not done per department protocol    Reason for visit: RECHECK    Allie BESSF

## 2024-10-15 NOTE — LETTER
"October 15, 2024    Kaia Smith  [address] [place] [address]      Dear Kaia,    It was a pleasure speaking with you on the phone on 10/15/2024. Here is a copy of the progress note from our discussion. If you have any additional questions, please feel free to call.    Referring Provider: THAO Kincaid CNP     Presenting Information:  I spoke to Kaia by telephone today to discuss her genetic testing results. A saliva kit was sent to her house on 6/26/24. A Custom Cancer panel was ordered from [employer] [facility]. This testing was done because of Kaia's family history of uterine and breast cancer.    Genetic Testing Result: NEGATIVE  Kaia is negative for any pathogenic (harmful) mutations in APC, JUAN, AXIN2, BAP1, BARD1, BLM, BMPR1A, BRCA1, BRCA2, BRIP1, BUB1B, CDC73, CDH1, CDK4, CDKN2A, CDKN1C, CEP57, CHEK2, CTNNA1, DICER1, DIS3L2, EPCAM, FH, FLCN, GREM1, GPC3, HOXB13, KIT, MBD4, MEN1, MET, MLH1, MSH2, MSH3, MSH6, MUTYH, NF1, NTHL1, PALB2, PDGFRA, PMS2, POLD1, POLE, PTEN, RAD51C, RAD51D, REST, SDHA, SDHB, SDHC, SDHD, SMAD4, SMARCA4, SMARCB1, STK11, TP53, TRIM28, TRIP13, TSC1, TSC2, VHL, and WT1. No pathogenic mutations were found in any of the 62 genes analyzed. This test involved sequencing and deletion/duplication analysis of all genes with the exceptions of EPCAM and GREM1 (deletions/duplications only) and SDHA (sequencing only).    A copy of the test report can be found in the Laboratory tab, dated 8/23/24, and named \"LABORATORY MISCELLANEOUS ORDER\". The report is scanned in as a linked document.    Interpretation:  We discussed several different interpretations of this negative test result.    One explanation may be that there is a different gene or combination of genes and environment that are associated with the cancers in this family.  It is possible that her paternal cousin with breast cancer did have a mutation in one of these genes and she did not inherit it.  There is also a small possibility that " there is a mutation in one of these genes, and the testing laboratory could not find it with their current testing methods.       Screening:  Based on this negative test result, it is important for Kaia and her relatives to refer back to the family history for appropriate cancer screening.    Due to Kaia's family history of uterine cancer, Kaia remains at slightly increased risk for uterine cancer. We discussed available uterine cancer screening (pelvic exams, endometrial sampling, transvaginal ultrasounds) as well as the significant limitations of this screening. As such, this screening is not typically recommended. That being said, women in this family should discuss their family history, this screening, and the signs and symptoms of uterine cancer with their primary OB/GYN provider, as they may have individualized recommendations.  Other population cancer screening options, such as those recommended by the American Cancer Society and the National Comprehensive Cancer Network (NCCN), are also appropriate for Kaia and her family. These screening recommendations may change if there are changes to Kaia's personal and/or family history of cancer. Final screening recommendations should be made in consultation with each individual's primary care provider.      Inheritance:  We reviewed autosomal dominant inheritance. We discussed that Kaia did not pass on an identifiable mutation in these genes to her children based on this test result. Mutations in these genes do not skip generations.      Additional Testing Considerations:  Although Kaia's genetic testing result was negative, other relatives may still carry a gene mutation associated with breast, uterine, and/or kidney cancer. Genetic counseling is recommended for other close paternal relatives to discuss genetic testing options. If any of these relatives do pursue genetic testing, Kaia is encouraged to contact me so that we may review the impact of their test results  on her.    Summary:  We do not have an explanation for Kaia's family history of cancer. While no genetic changes were identified, Kaia may still be at risk for certain cancers due to family history, environmental factors, or other genetic causes not identified by this test. Because of that, it is important that she continue with cancer screening based on her personal and family history as discussed above.    Genetic testing is rapidly advancing, and new cancer susceptibility genes will most likely be identified in the future. Therefore, I encouraged Kaia to contact me regularly or if there are changes in her personal or family history. This may change how we assess her cancer risk, screening, and the testing we would offer.    Plan:  1.  A copy of the test results will be sent to Kaia.  2. She plans to follow-up with her other providers.  3. She should contact me regularly, or sooner if her family history changes.    If Kaia has any further questions, I encouraged her to contact me via [employer].    Time spent on the phone: 5 minutes.    Eliseo De La Garza MS, [facility] [place]  Licensed, Certified Genetic Counselor

## 2024-10-15 NOTE — Clinical Note
"10/15/2024      Kaia Smith  [address]  [place] [address]      Dear Colleague,    Thank you for referring your patient, Kaia Smith, to the [facility]. Please see a copy of my visit note below.    10/15/2024    Referring Provider: ***    Presenting Information:  I spoke to Kaia by telephone today to discuss her genetic testing results. Her blood was drawn on ***. *** panel was ordered from [employer]***. This testing was done because of Kaia's personal and family history of ***.    Genetic Testing Result: NEGATIVE  Kaia is negative for mutations in ***. No mutations were found in her *** gene.      ***A copy of the test report can be found in the Laboratory tab, dated ***, and named \"LABORATORY MISCELLANEOUS ORDER\". The report is scanned in as a linked document.    ***A copy of the test report can be found in the Laboratory tab, dated ***, and named \"Next generation sequencing\".     Interpretation:  We discussed several different interpretations of this negative test result.    One explanation may be that there is a different gene or combination of genes and environment that are associated with the cancers in this family.  It is possible that her *** did have a mutation in one of these genes and she did not inherit it.  There is also a small possibility that there is a mutation in one of these genes, and the testing laboratory could not find it with their current testing methods.       Screening:  Based on this negative test result, it is important for Kaia and her relatives to refer back to the family history for appropriate cancer screening.    ***      Inheritance:  We reviewed autosomal dominant inheritance. We discussed that Kaia cannot/***did not pass on an identifiable mutation in these genes to her children based on this test result. Mutations in these genes do not skip generations.      Additional Testing Considerations:  ***It is possible Kaia does carry a " currently identifiable gene or combination of genes and environment that increase her risk for *** cancer. We again reviewed the option of larger gene panels covering more moderate risk genes associated with *** cancer. Kaia is encouraged to contact me if she wishes to readdress these larger gene panel options.     Although Kaia's genetic testing result was negative, other relatives may still carry a gene mutation associated with *** cancer. Genetic counseling is recommended for *** to discuss genetic testing options. *** If any of these relatives do pursue genetic testing, Kaia is encouraged to contact me so that we may review the impact of their test results on her.    Summary:  We do not have an explanation for Kaia's *** cancer. While no genetic changes were identified, Kaia may still be at risk for certain cancers due to family history, environmental factors, or other genetic causes not identified by this test. Because of that, it is important that she continue with cancer screening based on her personal and family history as discussed above.    Genetic testing is rapidly advancing, and new cancer susceptibility genes will most likely be identified in the future. Therefore, I encouraged Kaia to contact me regularly or if there are changes in her personal or family history. This may change how we assess her cancer risk, screening, and the testing we would offer.    Plan:  1.  A copy of the test results will be sent to Kaia.  2. She plans to follow-up with her other providers.  3. She should contact me regularly, or sooner if her family history changes.    If Kaia has any further questions, I encouraged her to contact me via [employer].    Time spent on the phone: 5 minutes.    Eliseo De La Garza MS, [facility] [place]ensed, Certified Genetic Counselor    ***    Virtual Visit Details    Type of service:  Telephone Visit     Originating Location (pt. Location): Home  Distant Location (provider location):  Off-site      Again, thank  you for allowing me to participate in the care of your patient.        Sincerely,        Eliseo De La Garza GC

## 2024-10-21 ENCOUNTER — ANCILLARY PROCEDURE (OUTPATIENT)
Dept: MAMMOGRAPHY | Facility: CLINIC | Age: 44
End: 2024-10-21
Payer: COMMERCIAL

## 2024-10-21 DIAGNOSIS — Z12.31 VISIT FOR SCREENING MAMMOGRAM: ICD-10-CM

## 2024-10-21 PROCEDURE — 77067 SCR MAMMO BI INCL CAD: CPT | Performed by: RADIOLOGY

## 2024-10-21 PROCEDURE — 77063 BREAST TOMOSYNTHESIS BI: CPT | Performed by: RADIOLOGY

## 2025-04-19 ENCOUNTER — HEALTH MAINTENANCE LETTER (OUTPATIENT)
Age: 45
End: 2025-04-19

## 2025-07-22 ENCOUNTER — VIRTUAL VISIT (OUTPATIENT)
Dept: URGENT CARE | Facility: CLINIC | Age: 45
End: 2025-07-22
Payer: COMMERCIAL

## 2025-07-22 DIAGNOSIS — H57.9 ITCH OF LEFT EYE: Primary | ICD-10-CM

## 2025-07-22 PROCEDURE — 98005 SYNCH AUDIO-VIDEO EST LOW 20: CPT

## 2025-07-22 RX ORDER — OLOPATADINE HYDROCHLORIDE 2 MG/ML
1 SOLUTION OPHTHALMIC DAILY
Qty: 2.5 ML | Refills: 0 | Status: SHIPPED | OUTPATIENT
Start: 2025-07-22

## 2025-07-22 NOTE — PATIENT INSTRUCTIONS
Based on our discussion, I have outlined the following instructions for you:       - Use the antihistamine eye drops that have been sent to your pharmacy. They will help with the itchiness in your left eye.  - Apply a cold compress to your left eye. You can do this by placing a clean, cold, damp cloth over your eye to help reduce swelling and itchiness.  - If your eye does not get better, make an appointment to visit the clinic for a follow-up.     Thank you again for your visit, and we look forward to supporting you in your journey to better health.

## 2025-07-22 NOTE — PROGRESS NOTES
Kaia is a 44 year old female who presents for a billable video visit.    ASSESSMENT/PLAN:  Diagnoses and all orders for this visit:    Itch of left eye  -     olopatadine (PATADAY) 0.2 % ophthalmic solution; Place 0.05 mLs (1 drop) Into the left eye daily. As needed for eye itching    Itch of left eye:  Left eye is swollen and itchy, not painful. No rash, discharge, or conjunctivitis. Does not appear to be a stye or dermatitis.  Recommend antihistamine eye drops (sent to pharmacy) and cold compresses. Follow up in clinic if no improvement.       Follow up with primary care provider with any problems, questions or concerns or if symptoms worsen or fail to improve. Patient agreed to plan and verbalized understanding.     Subjective  Kaia Smith, 44 years     Swollen and itchy left eye  Kaia Smith reports swelling and itchiness in her left eye since last evening. She initially suspected pink eye, but by morning, swelling appeared under the eye. The itchiness is inside the eye, without pain. She notes no rash, dry skin, bug bites, or discharge. Conjunctiva appears normal. She has not used antihistamines, eye drops, or ice yet.     Objective  Physical exam:  - Swelling under left eye  - Itchy sensation in left eye  - Conjunctiva normal, no erythema or discharge         Video-Visit Details    Type of service:  Video Visit  Video Start Time: 11:32 am  Video End Time: 11:40 am    Originating Location: Home    Distant Location:  [facility]     Platform used for Video Visit: Serafin Espinosa PA-C    Consent was obtained from the patient to use an AI documentation tool in the creation of this note.